# Patient Record
Sex: MALE | Race: WHITE | NOT HISPANIC OR LATINO | Employment: OTHER | URBAN - METROPOLITAN AREA
[De-identification: names, ages, dates, MRNs, and addresses within clinical notes are randomized per-mention and may not be internally consistent; named-entity substitution may affect disease eponyms.]

---

## 2017-01-23 ENCOUNTER — COMMUNICATION - HEALTHEAST (OUTPATIENT)
Dept: CARDIOLOGY | Facility: CLINIC | Age: 77
End: 2017-01-23

## 2017-03-20 ENCOUNTER — AMBULATORY - HEALTHEAST (OUTPATIENT)
Dept: CARDIOLOGY | Facility: CLINIC | Age: 77
End: 2017-03-20

## 2017-03-20 DIAGNOSIS — Z95.810 ICD (IMPLANTABLE CARDIOVERTER-DEFIBRILLATOR), SINGLE, IN SITU: ICD-10-CM

## 2017-03-20 ASSESSMENT — MIFFLIN-ST. JEOR: SCORE: 1498.25

## 2017-03-21 ENCOUNTER — COMMUNICATION - HEALTHEAST (OUTPATIENT)
Dept: CARDIOLOGY | Facility: CLINIC | Age: 77
End: 2017-03-21

## 2017-05-10 ENCOUNTER — COMMUNICATION - HEALTHEAST (OUTPATIENT)
Dept: CARDIOLOGY | Facility: CLINIC | Age: 77
End: 2017-05-10

## 2017-05-10 DIAGNOSIS — I42.9 CARDIOMYOPATHY (H): ICD-10-CM

## 2017-05-24 ENCOUNTER — COMMUNICATION - HEALTHEAST (OUTPATIENT)
Dept: CARDIOLOGY | Facility: CLINIC | Age: 77
End: 2017-05-24

## 2017-06-19 ENCOUNTER — AMBULATORY - HEALTHEAST (OUTPATIENT)
Dept: CARDIOLOGY | Facility: CLINIC | Age: 77
End: 2017-06-19

## 2017-06-19 DIAGNOSIS — Z95.810 ICD (IMPLANTABLE CARDIOVERTER-DEFIBRILLATOR), SINGLE, IN SITU: ICD-10-CM

## 2017-06-19 LAB — HCC DEVICE COMMENTS: NORMAL

## 2017-09-25 ENCOUNTER — AMBULATORY - HEALTHEAST (OUTPATIENT)
Dept: CARDIOLOGY | Facility: CLINIC | Age: 77
End: 2017-09-25

## 2017-09-25 DIAGNOSIS — Z95.810 ICD (IMPLANTABLE CARDIOVERTER-DEFIBRILLATOR), SINGLE, IN SITU: ICD-10-CM

## 2017-09-25 LAB — HCC DEVICE COMMENTS: NORMAL

## 2017-09-30 ENCOUNTER — COMMUNICATION - HEALTHEAST (OUTPATIENT)
Dept: CARDIOLOGY | Facility: CLINIC | Age: 77
End: 2017-09-30

## 2017-09-30 DIAGNOSIS — I42.9 CARDIOMYOPATHY (H): ICD-10-CM

## 2017-10-05 ENCOUNTER — RECORDS - HEALTHEAST (OUTPATIENT)
Dept: ADMINISTRATIVE | Facility: OTHER | Age: 77
End: 2017-10-05

## 2017-10-05 ENCOUNTER — AMBULATORY - HEALTHEAST (OUTPATIENT)
Dept: CARDIOLOGY | Facility: CLINIC | Age: 77
End: 2017-10-05

## 2017-10-11 ENCOUNTER — OFFICE VISIT - HEALTHEAST (OUTPATIENT)
Dept: CARDIOLOGY | Facility: CLINIC | Age: 77
End: 2017-10-11

## 2017-10-11 DIAGNOSIS — I42.0 DILATED CARDIOMYOPATHY (H): ICD-10-CM

## 2017-10-11 ASSESSMENT — MIFFLIN-ST. JEOR: SCORE: 1410.25

## 2017-10-12 ENCOUNTER — COMMUNICATION - HEALTHEAST (OUTPATIENT)
Dept: CARDIOLOGY | Facility: CLINIC | Age: 77
End: 2017-10-12

## 2017-10-12 DIAGNOSIS — I42.9 CARDIOMYOPATHY (H): ICD-10-CM

## 2017-10-23 ENCOUNTER — HOSPITAL ENCOUNTER (OUTPATIENT)
Dept: CARDIOLOGY | Facility: CLINIC | Age: 77
Discharge: HOME | End: 2017-10-23
Attending: INTERNAL MEDICINE

## 2017-10-23 DIAGNOSIS — I42.0 DILATED CARDIOMYOPATHY (H): ICD-10-CM

## 2017-10-23 LAB
AORTIC ROOT: 4.5 CM
AORTIC VALVE MEAN VELOCITY: 105 CM/S
AR DECEL SLOPE: 2570 MM/S2
AR PEAK VELOCITY: 459 CM/S
AV DIMENSIONLESS INDEX VTI: 0.5
AV MEAN GRADIENT: 5 MMHG
AV PEAK GRADIENT: 9.5 MMHG
AV REGURGITANT PEAK GRADIENT: 84.3 MMHG
AV REGURGITATION PRESSURE HALF TIME: 523 MS
AV VALVE AREA: 2.1 CM2
AV VELOCITY RATIO: 0.5
BSA FOR ECHO PROCEDURE: 1.83 M2
CV BLOOD PRESSURE: NORMAL MMHG
CV ECHO HEIGHT: 70 IN
CV ECHO WEIGHT: 150 LBS
DOP CALC AO PEAK VEL: 154 CM/S
DOP CALC AO VTI: 33.6 CM
DOP CALC LVOT AREA: 4.15 CM2
DOP CALC LVOT DIAMETER: 2.3 CM
DOP CALC LVOT PEAK VEL: 83.1 CM/S
DOP CALC LVOT STROKE VOLUME: 70.2 CM3
DOP CALCLVOT PEAK VEL VTI: 16.9 CM
EJECTION FRACTION: 37 % (ref 55–75)
FRACTIONAL SHORTENING: 26.8 % (ref 28–44)
INTERVENTRICULAR SEPTUM IN END DIASTOLE: 1.4 CM (ref 0.6–1)
IVS/PW RATIO: 0.9
LA AREA 1: 33.4 CM2
LA AREA 2: 40.1 CM2
LEFT ATRIUM LENGTH: 6.77 CM
LEFT ATRIUM SIZE: 5.4 CM
LEFT ATRIUM TO AORTIC ROOT RATIO: 1.23 NO UNITS
LEFT ATRIUM VOLUME INDEX: 91.9 ML/M2
LEFT ATRIUM VOLUME: 168.2 CM3
LEFT VENTRICLE CARDIAC INDEX: 2.8 L/MIN/M2
LEFT VENTRICLE CARDIAC OUTPUT: 5.1 L/MIN
LEFT VENTRICLE DIASTOLIC VOLUME INDEX: 47.5 CM3/M2 (ref 34–74)
LEFT VENTRICLE DIASTOLIC VOLUME: 87 CM3 (ref 62–150)
LEFT VENTRICLE HEART RATE: 72 BPM
LEFT VENTRICLE MASS INDEX: 199.7 G/M2
LEFT VENTRICLE SYSTOLIC VOLUME INDEX: 30.1 CM3/M2 (ref 11–31)
LEFT VENTRICLE SYSTOLIC VOLUME: 55 CM3 (ref 21–61)
LEFT VENTRICULAR INTERNAL DIMENSION IN DIASTOLE: 5.6 CM (ref 4.2–5.8)
LEFT VENTRICULAR INTERNAL DIMENSION IN SYSTOLE: 4.1 CM (ref 2.5–4)
LEFT VENTRICULAR MASS: 365.4 G
LEFT VENTRICULAR OUTFLOW TRACT MEAN GRADIENT: 1 MMHG
LEFT VENTRICULAR OUTFLOW TRACT MEAN VELOCITY: 50.2 CM/S
LEFT VENTRICULAR OUTFLOW TRACT PEAK GRADIENT: 3 MMHG
LEFT VENTRICULAR POSTERIOR WALL IN END DIASTOLE: 1.5 CM (ref 0.6–1)
LV STROKE VOLUME INDEX: 38.3 ML/M2
MITRAL REGURGITANT VELOCITY TIME INTEGRAL: 207 CM
MR MEAN GRADIENT: 88 MMHG
MR MEAN GRADIENT: 88 MMHG
MR MEAN VELOCITY: 439 CM/S
MR MEAN VELOCITY: 439 CM/S
MR PEAK GRADIENT: 135.5 MMHG
MR PISA EROA: 0.1 CM2
MR PISA RADIUS: 0.7 CM
MR PISA VN NYQUIST: 23.1 CM/S
MV E'TISSUE VEL-LAT: 11.8 CM/S
MV E'TISSUE VEL-MED: 5.26 CM/S
MV REGURGITANT VOLUME: 25.3 CC
NUC REST DIASTOLIC VOLUME INDEX: 2400 LBS
NUC REST SYSTOLIC VOLUME INDEX: 70 IN
PISA MR PEAK VEL: 582 CM/S
PR MAX PG: 6 MMHG
PR PEAK VELOCITY: 138 CM/S
TRICUSPID REGURGITATION PEAK PRESSURE GRADIENT: 40.7 MMHG
TRICUSPID VALVE ANULAR PLANE SYSTOLIC EXCURSION: 3.2 CM
TRICUSPID VALVE PEAK REGURGITANT VELOCITY: 319 CM/S

## 2017-10-23 ASSESSMENT — MIFFLIN-ST. JEOR: SCORE: 1396.65

## 2017-10-24 ENCOUNTER — AMBULATORY - HEALTHEAST (OUTPATIENT)
Dept: CARDIOLOGY | Facility: CLINIC | Age: 77
End: 2017-10-24

## 2017-10-24 DIAGNOSIS — I42.9 CARDIOMYOPATHY (H): ICD-10-CM

## 2017-11-01 ENCOUNTER — COMMUNICATION - HEALTHEAST (OUTPATIENT)
Dept: CARDIOLOGY | Facility: CLINIC | Age: 77
End: 2017-11-01

## 2018-01-03 ENCOUNTER — AMBULATORY - HEALTHEAST (OUTPATIENT)
Dept: CARDIOLOGY | Facility: CLINIC | Age: 78
End: 2018-01-03

## 2018-01-03 DIAGNOSIS — Z95.810 ICD (IMPLANTABLE CARDIOVERTER-DEFIBRILLATOR), SINGLE, IN SITU: ICD-10-CM

## 2018-01-04 LAB — HCC DEVICE COMMENTS: NORMAL

## 2018-03-19 ENCOUNTER — AMBULATORY - HEALTHEAST (OUTPATIENT)
Dept: CARDIOLOGY | Facility: CLINIC | Age: 78
End: 2018-03-19

## 2018-03-19 DIAGNOSIS — Z95.810 ICD (IMPLANTABLE CARDIOVERTER-DEFIBRILLATOR), SINGLE, IN SITU: ICD-10-CM

## 2018-03-19 RX ORDER — BRIMONIDINE TARTRATE 2 MG/ML
1 SOLUTION/ DROPS OPHTHALMIC 2 TIMES DAILY
Status: SHIPPED | COMMUNITY
Start: 2018-03-19

## 2018-03-19 ASSESSMENT — MIFFLIN-ST. JEOR: SCORE: 1455.61

## 2018-03-20 ENCOUNTER — COMMUNICATION - HEALTHEAST (OUTPATIENT)
Dept: CARDIOLOGY | Facility: CLINIC | Age: 78
End: 2018-03-20

## 2018-03-20 LAB — HCC DEVICE COMMENTS: NORMAL

## 2018-05-18 ENCOUNTER — RECORDS - HEALTHEAST (OUTPATIENT)
Dept: LAB | Facility: CLINIC | Age: 78
End: 2018-05-18

## 2018-05-18 LAB
HGB BLD-MCNC: 14.1 G/DL (ref 14–18)
POTASSIUM BLD-SCNC: 4.6 MMOL/L (ref 3.5–5)

## 2018-06-13 ENCOUNTER — AMBULATORY - HEALTHEAST (OUTPATIENT)
Dept: CARDIOLOGY | Facility: CLINIC | Age: 78
End: 2018-06-13

## 2018-06-13 DIAGNOSIS — Z95.810 ICD (IMPLANTABLE CARDIOVERTER-DEFIBRILLATOR), SINGLE, IN SITU: ICD-10-CM

## 2018-06-13 LAB
HCC DEVICE COMMENTS: NORMAL
HCC DEVICE IMPLANTING PROVIDER: NORMAL
HCC DEVICE MANUFACTURE: NORMAL
HCC DEVICE MODEL: NORMAL
HCC DEVICE SERIAL NUMBER: NORMAL
HCC DEVICE TYPE: NORMAL

## 2018-07-23 ENCOUNTER — MEDICAL CORRESPONDENCE (OUTPATIENT)
Dept: HEALTH INFORMATION MANAGEMENT | Facility: CLINIC | Age: 78
End: 2018-07-23

## 2018-07-23 ENCOUNTER — TRANSFERRED RECORDS (OUTPATIENT)
Dept: HEALTH INFORMATION MANAGEMENT | Facility: CLINIC | Age: 78
End: 2018-07-23

## 2018-07-31 ENCOUNTER — COMMUNICATION - HEALTHEAST (OUTPATIENT)
Dept: ADMINISTRATIVE | Facility: CLINIC | Age: 78
End: 2018-07-31

## 2018-09-19 ENCOUNTER — AMBULATORY - HEALTHEAST (OUTPATIENT)
Dept: CARDIOLOGY | Facility: CLINIC | Age: 78
End: 2018-09-19

## 2018-09-19 DIAGNOSIS — Z95.810 ICD (IMPLANTABLE CARDIOVERTER-DEFIBRILLATOR), SINGLE, IN SITU: ICD-10-CM

## 2018-10-02 ENCOUNTER — RECORDS - HEALTHEAST (OUTPATIENT)
Dept: ADMINISTRATIVE | Facility: OTHER | Age: 78
End: 2018-10-02

## 2018-10-09 ENCOUNTER — AMBULATORY - HEALTHEAST (OUTPATIENT)
Dept: CARDIOLOGY | Facility: CLINIC | Age: 78
End: 2018-10-09

## 2018-10-10 ENCOUNTER — OFFICE VISIT - HEALTHEAST (OUTPATIENT)
Dept: CARDIOLOGY | Facility: CLINIC | Age: 78
End: 2018-10-10

## 2018-10-10 DIAGNOSIS — I50.22 CHRONIC SYSTOLIC HEART FAILURE (H): ICD-10-CM

## 2018-10-10 LAB
ANION GAP SERPL CALCULATED.3IONS-SCNC: 6 MMOL/L (ref 5–18)
BNP SERPL-MCNC: 193 PG/ML (ref 0–80)
BUN SERPL-MCNC: 22 MG/DL (ref 8–28)
CALCIUM SERPL-MCNC: 10.2 MG/DL (ref 8.5–10.5)
CHLORIDE BLD-SCNC: 103 MMOL/L (ref 98–107)
CO2 SERPL-SCNC: 28 MMOL/L (ref 22–31)
CREAT SERPL-MCNC: 0.82 MG/DL (ref 0.7–1.3)
GFR SERPL CREATININE-BSD FRML MDRD: >60 ML/MIN/1.73M2
GLUCOSE BLD-MCNC: 88 MG/DL (ref 70–125)
POTASSIUM BLD-SCNC: 5 MMOL/L (ref 3.5–5)
SODIUM SERPL-SCNC: 137 MMOL/L (ref 136–145)

## 2018-10-10 ASSESSMENT — MIFFLIN-ST. JEOR: SCORE: 1446.54

## 2018-10-29 ENCOUNTER — RECORDS - HEALTHEAST (OUTPATIENT)
Dept: LAB | Facility: CLINIC | Age: 78
End: 2018-10-29

## 2018-10-29 LAB
HGB BLD-MCNC: 15.1 G/DL (ref 14–18)
POTASSIUM BLD-SCNC: 5 MMOL/L (ref 3.5–5)

## 2018-12-05 ENCOUNTER — COMMUNICATION - HEALTHEAST (OUTPATIENT)
Dept: CARDIOLOGY | Facility: CLINIC | Age: 78
End: 2018-12-05

## 2018-12-05 DIAGNOSIS — I42.9 CARDIOMYOPATHY (H): ICD-10-CM

## 2018-12-12 ENCOUNTER — COMMUNICATION - HEALTHEAST (OUTPATIENT)
Dept: CARDIOLOGY | Facility: CLINIC | Age: 78
End: 2018-12-12

## 2018-12-12 DIAGNOSIS — I42.9 CARDIOMYOPATHY (H): ICD-10-CM

## 2018-12-26 ENCOUNTER — AMBULATORY - HEALTHEAST (OUTPATIENT)
Dept: CARDIOLOGY | Facility: CLINIC | Age: 78
End: 2018-12-26

## 2018-12-26 DIAGNOSIS — Z95.810 ICD (IMPLANTABLE CARDIOVERTER-DEFIBRILLATOR), SINGLE, IN SITU: ICD-10-CM

## 2019-03-22 ENCOUNTER — AMBULATORY - HEALTHEAST (OUTPATIENT)
Dept: CARDIOLOGY | Facility: CLINIC | Age: 79
End: 2019-03-22

## 2019-03-22 DIAGNOSIS — Z95.810 ICD (IMPLANTABLE CARDIOVERTER-DEFIBRILLATOR), SINGLE, IN SITU: ICD-10-CM

## 2019-03-22 RX ORDER — DORZOLAMIDE HCL 20 MG/ML
1 SOLUTION/ DROPS OPHTHALMIC DAILY
Status: SHIPPED | COMMUNITY
Start: 2019-02-21

## 2019-03-22 ASSESSMENT — MIFFLIN-ST. JEOR: SCORE: 1460.15

## 2019-06-18 ENCOUNTER — AMBULATORY - HEALTHEAST (OUTPATIENT)
Dept: CARDIOLOGY | Facility: CLINIC | Age: 79
End: 2019-06-18

## 2019-06-18 DIAGNOSIS — Z95.810 ICD (IMPLANTABLE CARDIOVERTER-DEFIBRILLATOR), SINGLE, IN SITU: ICD-10-CM

## 2019-07-24 ENCOUNTER — COMMUNICATION - HEALTHEAST (OUTPATIENT)
Dept: ADMINISTRATIVE | Facility: CLINIC | Age: 79
End: 2019-07-24

## 2019-09-19 ENCOUNTER — AMBULATORY - HEALTHEAST (OUTPATIENT)
Dept: CARDIOLOGY | Facility: CLINIC | Age: 79
End: 2019-09-19

## 2019-09-19 DIAGNOSIS — Z95.810 ICD (IMPLANTABLE CARDIOVERTER-DEFIBRILLATOR), SINGLE, IN SITU: ICD-10-CM

## 2019-09-27 ENCOUNTER — RECORDS - HEALTHEAST (OUTPATIENT)
Dept: ADMINISTRATIVE | Facility: OTHER | Age: 79
End: 2019-09-27

## 2019-09-27 ENCOUNTER — AMBULATORY - HEALTHEAST (OUTPATIENT)
Dept: CARDIOLOGY | Facility: CLINIC | Age: 79
End: 2019-09-27

## 2019-10-07 ENCOUNTER — COMMUNICATION - HEALTHEAST (OUTPATIENT)
Dept: CARDIOLOGY | Facility: CLINIC | Age: 79
End: 2019-10-07

## 2019-10-07 DIAGNOSIS — I42.9 CARDIOMYOPATHY (H): ICD-10-CM

## 2019-11-08 ENCOUNTER — AMBULATORY - HEALTHEAST (OUTPATIENT)
Dept: CARDIOLOGY | Facility: CLINIC | Age: 79
End: 2019-11-08

## 2019-11-08 ENCOUNTER — OFFICE VISIT - HEALTHEAST (OUTPATIENT)
Dept: CARDIOLOGY | Facility: CLINIC | Age: 79
End: 2019-11-08

## 2019-11-08 DIAGNOSIS — I42.0 DILATED CARDIOMYOPATHY (H): ICD-10-CM

## 2019-11-08 DIAGNOSIS — R93.1 LEFT VENTRICULAR EJECTION FRACTION LESS THAN 40%: ICD-10-CM

## 2019-11-08 DIAGNOSIS — Z95.810 ICD (IMPLANTABLE CARDIOVERTER-DEFIBRILLATOR), SINGLE, IN SITU: ICD-10-CM

## 2019-11-08 DIAGNOSIS — I50.22 CHRONIC SYSTOLIC HEART FAILURE (H): ICD-10-CM

## 2019-11-08 ASSESSMENT — MIFFLIN-ST. JEOR: SCORE: 1438.83

## 2020-01-11 ENCOUNTER — COMMUNICATION - HEALTHEAST (OUTPATIENT)
Dept: CARDIOLOGY | Facility: CLINIC | Age: 80
End: 2020-01-11

## 2020-01-11 DIAGNOSIS — I42.9 CARDIOMYOPATHY (H): ICD-10-CM

## 2020-01-17 ENCOUNTER — COMMUNICATION - HEALTHEAST (OUTPATIENT)
Dept: CARDIOLOGY | Facility: CLINIC | Age: 80
End: 2020-01-17

## 2020-01-17 DIAGNOSIS — I42.9 CARDIOMYOPATHY (H): ICD-10-CM

## 2020-02-10 ENCOUNTER — AMBULATORY - HEALTHEAST (OUTPATIENT)
Dept: CARDIOLOGY | Facility: CLINIC | Age: 80
End: 2020-02-10

## 2020-02-10 DIAGNOSIS — R93.1 LEFT VENTRICULAR EJECTION FRACTION LESS THAN 40%: ICD-10-CM

## 2020-02-10 DIAGNOSIS — Z95.810 ICD (IMPLANTABLE CARDIOVERTER-DEFIBRILLATOR), SINGLE, IN SITU: ICD-10-CM

## 2020-05-11 ENCOUNTER — AMBULATORY - HEALTHEAST (OUTPATIENT)
Dept: CARDIOLOGY | Facility: CLINIC | Age: 80
End: 2020-05-11

## 2020-05-11 DIAGNOSIS — I42.0 DILATED CARDIOMYOPATHY (H): ICD-10-CM

## 2020-05-11 DIAGNOSIS — Z95.810 ICD (IMPLANTABLE CARDIOVERTER-DEFIBRILLATOR), SINGLE, IN SITU: ICD-10-CM

## 2020-08-12 ENCOUNTER — AMBULATORY - HEALTHEAST (OUTPATIENT)
Dept: CARDIOLOGY | Facility: CLINIC | Age: 80
End: 2020-08-12

## 2020-08-12 DIAGNOSIS — Z95.810 ICD (IMPLANTABLE CARDIOVERTER-DEFIBRILLATOR), SINGLE, IN SITU: ICD-10-CM

## 2020-08-12 DIAGNOSIS — I42.0 DILATED CARDIOMYOPATHY (H): ICD-10-CM

## 2020-10-13 ENCOUNTER — COMMUNICATION - HEALTHEAST (OUTPATIENT)
Dept: CARDIOLOGY | Facility: CLINIC | Age: 80
End: 2020-10-13

## 2020-10-13 DIAGNOSIS — I42.9 CARDIOMYOPATHY (H): ICD-10-CM

## 2020-11-11 ENCOUNTER — AMBULATORY - HEALTHEAST (OUTPATIENT)
Dept: CARDIOLOGY | Facility: CLINIC | Age: 80
End: 2020-11-11

## 2020-11-11 DIAGNOSIS — I42.0 DILATED CARDIOMYOPATHY (H): ICD-10-CM

## 2020-11-11 DIAGNOSIS — Z95.810 ICD (IMPLANTABLE CARDIOVERTER-DEFIBRILLATOR), SINGLE, IN SITU: ICD-10-CM

## 2020-11-13 ENCOUNTER — OFFICE VISIT - HEALTHEAST (OUTPATIENT)
Dept: CARDIOLOGY | Facility: CLINIC | Age: 80
End: 2020-11-13

## 2020-11-13 ENCOUNTER — COMMUNICATION - HEALTHEAST (OUTPATIENT)
Dept: CARDIOLOGY | Facility: CLINIC | Age: 80
End: 2020-11-13

## 2020-11-13 DIAGNOSIS — I42.0 DILATED CARDIOMYOPATHY (H): ICD-10-CM

## 2020-11-13 DIAGNOSIS — I50.22 CHRONIC SYSTOLIC HEART FAILURE (H): ICD-10-CM

## 2020-11-13 LAB
ANION GAP SERPL CALCULATED.3IONS-SCNC: 8 MMOL/L (ref 5–18)
BNP SERPL-MCNC: 450 PG/ML (ref 0–84)
BUN SERPL-MCNC: 19 MG/DL (ref 8–28)
CALCIUM SERPL-MCNC: 9.4 MG/DL (ref 8.5–10.5)
CHLORIDE BLD-SCNC: 106 MMOL/L (ref 98–107)
CO2 SERPL-SCNC: 24 MMOL/L (ref 22–31)
CREAT SERPL-MCNC: 0.86 MG/DL (ref 0.7–1.3)
ERYTHROCYTE [DISTWIDTH] IN BLOOD BY AUTOMATED COUNT: 12 % (ref 11–14.5)
GFR SERPL CREATININE-BSD FRML MDRD: >60 ML/MIN/1.73M2
GLUCOSE BLD-MCNC: 102 MG/DL (ref 70–125)
HCT VFR BLD AUTO: 42.7 % (ref 40–54)
HGB BLD-MCNC: 14.2 G/DL (ref 14–18)
MCH RBC QN AUTO: 33.2 PG (ref 27–34)
MCHC RBC AUTO-ENTMCNC: 33.3 G/DL (ref 32–36)
MCV RBC AUTO: 100 FL (ref 80–100)
PLATELET # BLD AUTO: 210 THOU/UL (ref 140–440)
PMV BLD AUTO: 10 FL (ref 8.5–12.5)
POTASSIUM BLD-SCNC: 5 MMOL/L (ref 3.5–5)
RBC # BLD AUTO: 4.28 MILL/UL (ref 4.4–6.2)
SODIUM SERPL-SCNC: 138 MMOL/L (ref 136–145)
WBC: 7.6 THOU/UL (ref 4–11)

## 2020-11-13 RX ORDER — LATANOPROSTENE BUNOD 0.24 MG/ML
1 SOLUTION/ DROPS OPHTHALMIC DAILY
Status: SHIPPED | COMMUNITY
Start: 2020-10-13

## 2020-11-14 ENCOUNTER — COMMUNICATION - HEALTHEAST (OUTPATIENT)
Dept: CARDIOLOGY | Facility: CLINIC | Age: 80
End: 2020-11-14

## 2020-11-14 DIAGNOSIS — I42.9 CARDIOMYOPATHY (H): ICD-10-CM

## 2020-11-16 RX ORDER — FUROSEMIDE 20 MG
TABLET ORAL
Qty: 90 TABLET | Refills: 2 | Status: SHIPPED | OUTPATIENT
Start: 2020-11-16 | End: 2021-01-01

## 2021-01-01 ENCOUNTER — LAB REQUISITION (OUTPATIENT)
Dept: LAB | Facility: CLINIC | Age: 81
End: 2021-01-01
Payer: MEDICARE

## 2021-01-01 DIAGNOSIS — R05.9 COUGH, UNSPECIFIED: ICD-10-CM

## 2021-01-01 DIAGNOSIS — I42.9 CARDIOMYOPATHY (H): ICD-10-CM

## 2021-01-01 LAB
SARS-COV-2 RNA RESP QL NAA+PROBE: DETECTED
SARS-COV-2 RNA RESP QL NAA+PROBE: NORMAL

## 2021-01-01 PROCEDURE — U0005 INFEC AGEN DETEC AMPLI PROBE: HCPCS | Mod: ORL | Performed by: NURSE PRACTITIONER

## 2021-01-01 RX ORDER — FUROSEMIDE 20 MG
20 TABLET ORAL DAILY
Qty: 90 TABLET | Refills: 0 | Status: SHIPPED | OUTPATIENT
Start: 2021-01-01 | End: 2021-01-01

## 2021-01-01 RX ORDER — LOSARTAN POTASSIUM 50 MG/1
50 TABLET ORAL DAILY
Qty: 90 TABLET | Refills: 0 | Status: SHIPPED | OUTPATIENT
Start: 2021-01-01 | End: 2022-01-01

## 2021-01-12 ENCOUNTER — COMMUNICATION - HEALTHEAST (OUTPATIENT)
Dept: CARDIOLOGY | Facility: CLINIC | Age: 81
End: 2021-01-12

## 2021-01-12 DIAGNOSIS — I42.9 CARDIOMYOPATHY (H): ICD-10-CM

## 2021-01-12 RX ORDER — LOSARTAN POTASSIUM 50 MG/1
50 TABLET ORAL DAILY
Qty: 90 TABLET | Refills: 2 | Status: SHIPPED | OUTPATIENT
Start: 2021-01-12 | End: 2021-01-01

## 2021-02-10 ENCOUNTER — AMBULATORY - HEALTHEAST (OUTPATIENT)
Dept: CARDIOLOGY | Facility: CLINIC | Age: 81
End: 2021-02-10

## 2021-02-10 DIAGNOSIS — Z95.810 ICD (IMPLANTABLE CARDIOVERTER-DEFIBRILLATOR), SINGLE, IN SITU: ICD-10-CM

## 2021-02-10 DIAGNOSIS — I42.0 DILATED CARDIOMYOPATHY (H): ICD-10-CM

## 2021-05-12 ENCOUNTER — AMBULATORY - HEALTHEAST (OUTPATIENT)
Dept: CARDIOLOGY | Facility: CLINIC | Age: 81
End: 2021-05-12

## 2021-05-12 DIAGNOSIS — Z95.810 ICD (IMPLANTABLE CARDIOVERTER-DEFIBRILLATOR), SINGLE, IN SITU: ICD-10-CM

## 2021-05-12 DIAGNOSIS — I42.0 DILATED CARDIOMYOPATHY (H): ICD-10-CM

## 2021-05-26 NOTE — PROGRESS NOTES
In clinic device check.  Please see link for full device report.  Patient was informed of results and next follow up during today's visit.

## 2021-05-30 VITALS — HEIGHT: 70 IN | WEIGHT: 172.4 LBS | BODY MASS INDEX: 24.68 KG/M2

## 2021-05-31 VITALS — HEIGHT: 70 IN | WEIGHT: 153 LBS | BODY MASS INDEX: 21.9 KG/M2

## 2021-05-31 VITALS — HEIGHT: 70 IN | BODY MASS INDEX: 21.47 KG/M2 | WEIGHT: 150 LBS

## 2021-06-01 VITALS — BODY MASS INDEX: 23.34 KG/M2 | WEIGHT: 163 LBS | HEIGHT: 70 IN

## 2021-06-02 VITALS — WEIGHT: 161 LBS | HEIGHT: 70 IN | BODY MASS INDEX: 23.05 KG/M2

## 2021-06-02 VITALS — BODY MASS INDEX: 23.48 KG/M2 | HEIGHT: 70 IN | WEIGHT: 164 LBS

## 2021-06-03 VITALS
SYSTOLIC BLOOD PRESSURE: 128 MMHG | HEIGHT: 70 IN | HEART RATE: 60 BPM | WEIGHT: 159.3 LBS | DIASTOLIC BLOOD PRESSURE: 78 MMHG | RESPIRATION RATE: 24 BRPM | BODY MASS INDEX: 22.81 KG/M2

## 2021-06-03 NOTE — PATIENT INSTRUCTIONS - HE
Richard Millan,    It was a pleasure to see you today at the [facility].     My recommendations after this visit include:    Same medications    WSarah Pineda MD, FACC, JAIRO

## 2021-06-03 NOTE — PROGRESS NOTES
In clinic device check with Device RN and follow-up with Dr. Pineda.  Please see link for full device report.  Patient was informed of results and next follow up during today's visit.

## 2021-06-03 NOTE — PROGRESS NOTES
"Cardiology Progress Note    Assessment:  Dilated cardiomyopathy, moderately depressed LV systolic function, presumably nonischemic,   compensated, no fluid overload on physical exam today  Chronic atrial fibrillation, rate controlled, not anticoagulated per patient's decision  AICD, normal function      Plan:  Continue current medications    Follow-up in 1 year    Subjective:   This is 79 y.o. male who comes in today for follow-up visit.  He reports no new cardiac symptoms.  His weight has been stable.  He has no PND and orthopnea.  He denies chest pains or heart palpitations.  He is compliant with current medications    Review of Systems:   General: WNL  Eyes: WNL  Ears/Nose/Throat: WNL  Lungs: WNL  Heart: WNL  Stomach: WNL  Bladder: WNL  Muscle/Joints: WNL  Skin: WNL  Nervous System: WNL  Mental Health: WNL     Blood: WNL    Objective:   /78 (Patient Site: Left Arm, Patient Position: Sitting, Cuff Size: Adult Regular)   Pulse 60   Resp 24   Ht 5' 10\" (1.778 m)   Wt 159 lb 4.8 oz (72.3 kg)   BMI 22.86 kg/m    Physical Exam:  GENERAL: no distress  NECK: No JVD  LUNGS: Clear to auscultation.  CARDIAC: irregular rhythm, S1 & S2 normal.  No heaves, thrills, gallops or murmurs.  ABDOMEN: flat, negative hepatosplenomegaly, soft and non-tender.  EXTREMITIES: No evidence of cyanosis, clubbing or edema.    Current Outpatient Medications   Medication Sig Dispense Refill     aspirin 81 MG EC tablet Take 81 mg by mouth daily.       brimonidine (ALPHAGAN) 0.2 % ophthalmic solution Administer 1 drop to both eyes 2 (two) times a day.       dorzolamide (TRUSOPT) 2 % ophthalmic solution Administer 1 drop to both eyes daily.       FOLIC ACID/MULTIVIT-MIN/LUTEIN (CENTRUM SILVER ORAL) Take 1 tablet by mouth daily.       furosemide (LASIX) 20 MG tablet TAKE ONE TABLET BY MOUTH ONE TIME DAILY  90 tablet 2     latanoprost (XALATAN) 0.005 % ophthalmic solution Administer 1 drop to both eyes daily.  4     losartan (COZAAR) 50 MG " tablet TAKE ONE TABLET BY MOUTH ONE TIME DAILY 90 tablet 0     metoprolol succinate (TOPROL-XL) 25 MG Take 0.5 tablets (12.5 mg total) by mouth daily. (Patient taking differently: Take 25 mg by mouth daily. ) 45 tablet 3     No current facility-administered medications for this visit.        Cardiographics:    SPECT interrogation: Chronic A. fib 10% V paced short runs of nonsustained VT, normal device function    Cardiac MR: September 2015   1. Left ventricle is mildly enlarged with severe left ventricular systolic   dysfunction. There is no abnormal gadolinium enhancement. LVEF: 23%   2. Right ventricle is mildly enlarged with severe right   ventricular systolic dysfunction.   3. Moderate mitral regurgitation.   4. Normal ascending and descending aorta size.      Echo: October 2017  1. The left ventricle is normal in size. Left ventricular systolic performance is moderately reduced. The ejection fraction is estimated to be 35-40%.   2. There is moderate global reduction in left ventricular systolic performance.   3. There is mild concentric increase in left ventricular wall thickness.  4. There is mild aortic insufficiency.   5. There is moderate mitral insufficiency.   6. There is moderate tricuspid insufficiency.   7. Normal right ventricular size and systolic performance.   8. There is moderate to severe left atrial enlargement. There is moderate right atrial enlargement.   9. Right ventricular systolic pressure relative to right atrial pressure is mildly increased.  The pulmonary artery pressure is estimated to be 40-45 mmHg plus right atrial pressure (the IVC is of fairly normal caliber).      When compared to the prior real-time echocardiogram dated 3 February 2016, there has been a mild decline in left ventricular systolic performance from previous.  There is been an increase in the degree of mitral and tricuspid insufficiency which both appear to be moderate on the current examination.    Lab Results:          BNP   Date Value Ref Range Status   10/10/2018 193 (H) 0 - 80 pg/mL Final       Malcolm (Jv)  MD Miriam

## 2021-06-05 VITALS
SYSTOLIC BLOOD PRESSURE: 132 MMHG | WEIGHT: 159 LBS | OXYGEN SATURATION: 97 % | BODY MASS INDEX: 22.81 KG/M2 | DIASTOLIC BLOOD PRESSURE: 64 MMHG | RESPIRATION RATE: 16 BRPM | HEART RATE: 54 BPM

## 2021-06-13 NOTE — PROGRESS NOTES
"Cardiology Progress Note    Assessment:  Dilated cardiomyopathy, presumably nonischemic, recent episode of fluid overload, improved  Chronic atrial fibrillation, rate controlled, anticoagulated   AICD      Plan:  BMP and BNP today  Reassess LV systolic function with echo    He has been very reluctant to take CHF medications in the past.  We will need to convince him to increase medication doses if left ventricular function is down again.    Routine follow-up in 1 year    Subjective:   This is 77 y.o. male who comes in today for follow-up visit.  In May of this year he developed lower extremity edema.  He did not have increased shortness of breath at that time.  He was seen by his primary physician.  He was given higher dose of furosemide.  He has lost 20 pounds.  He denies PND and orthopnea.  He occasionally feels spells of shortness of breath.  His weight has not increased lately.  He denies exertional chest pains or heart palpitations.  He has not had the AICD firing    Review of Systems:   General: WNL  Eyes: WNL  Ears/Nose/Throat: WNL  Lungs: WNL  Heart: WNL  Stomach: WNL  Bladder: WNL  Muscle/Joints: WNL  Skin: WNL  Nervous System: WNL  Mental Health: WNL     Blood: WNL    Objective:   /84 (Patient Site: Left Arm, Patient Position: Sitting, Cuff Size: Adult Regular)  Pulse 68  Resp 16  Ht 5' 10\" (1.778 m)  Wt 153 lb (69.4 kg)  BMI 21.95 kg/m2  Physical Exam:  GENERAL: no distress  NECK: No JVD  LUNGS: Clear to auscultation.  CARDIAC: regular rhythm, S1 & S2 normal.  No heaves, thrills, gallops or murmurs.  ABDOMEN: flat, negative hepatosplenomegaly, soft and non-tender.  EXTREMITIES: No evidence of cyanosis, clubbing or edema.    Current Outpatient Prescriptions   Medication Sig Dispense Refill     FOLIC ACID/MULTIVIT-MIN/LUTEIN (CENTRUM SILVER ORAL) Take 1 tablet by mouth daily.       furosemide (LASIX) 20 MG tablet Take 20 mg by mouth every other day. Frequency change by Dr Pineda       losartan " (COZAAR) 25 MG tablet TAKE ONE TABLET BY MOUTH ONE TIME DAILY  90 tablet 0     MAGNESIUM OXIDE ORAL Take 1 tablet by mouth daily.       metoprolol succinate (TOPROL-XL) 25 MG Take 0.5 tablets (12.5 mg total) by mouth daily. 45 tablet 3     apixaban (ELIQUIS) 2.5 mg Tab tablet Take 1 tablet (2.5 mg total) by mouth 2 (two) times a day. 60 tablet 11     latanoprost (XALATAN) 0.005 % ophthalmic solution Administer 1 drop to both eyes daily.  4     No current facility-administered medications for this visit.        Cardiographics:    Cardiac MR September 2015 1. Left ventricle is mildly enlarged with severe left ventricular systolic   dysfunction. There is no abnormal gadolinium enhancement. LVEF: 23%   2. Right ventricle is mildly enlarged with severe right   ventricular systolic dysfunction.   3. Moderate mitral regurgitation.   4. Normal ascending and descending aorta size.     Echo: January 2016 Left ventricular size is borderline increased.  Global hypokinesis of the left ventricle with mild to moderate impairment  of systolic function.  Left ventricular ejection fraction is visually estimated to be 45%.  Abnormal (paradoxical) septal motion consistent with pacing.  Mildly thickened aortic valve leaflets with preserved leaflet mobility.  Mild aortic regurgitation.  Mild to moderate mitral regurgitation is present.  Mild tricuspid regurgitation.      BNP   Date Value Ref Range Status   01/14/2016 141 (H) 0 - 74 pg/mL Final       Malcolm Pineda MD (Ted)

## 2021-06-13 NOTE — PATIENT INSTRUCTIONS - HE
Richard Millan,    It was a pleasure to see you today at the [facility].     My recommendations after this visit include:    Blood work  Start taking furosemide every day if you notice increasing shortness of breath or weight gain    JUSTIN Pineda MD, FACC, [facility]

## 2021-06-15 PROBLEM — Z95.810 ICD (IMPLANTABLE CARDIOVERTER-DEFIBRILLATOR), SINGLE, IN SITU: Status: ACTIVE | Noted: 2017-03-20

## 2021-06-16 NOTE — PROGRESS NOTES
"Recheck of blood pressure: 148/80 mm Hg.  In clinic device check.  Please see link for full device report.  Patient was informed of results and next follow up during today's visit.     Mr. Millan tells me he \"stopped taking that Eliquis about a year ago.  It's way too expensive.  I told Dr. Pineda I wasn't taking it.  I take aspirin instead - 81 milligrams.\"  Mr. Millan has also stopped taking his magnesium.  Routed to Dr. Almendarez for review as Dr. Pineda is out of the office this week.  Dr. Pineda's last clinic note dated 10/11/2017 still indicates Mr. Millan as taking apixaban.  Mr. Millan's rhythm is permanent atrial fibrillation.  Sudha Orozco, RN, MA  "

## 2021-06-19 NOTE — LETTER
Letter by Malcolm Pineda MD (Ted) at      Author: Malcolm Pineda MD (Ted) Service: -- Author Type: --    Filed:  Encounter Date: 7/24/2019 Status: (Other)         Richard Millan  [address]  [place] [facility] [address]      July 24, 2019      Dear Richard,    This letter is to remind you that you will be due for your follow up appointment with Dr. Jv Pineda  . To help ensure you are in the best health possible, a regular follow-up with your cardiologist is essential.     Please call our Patient Scheduling Line at [phone] to schedule your appointment at your earliest convenience.  If you have recently scheduled an appointment, please disregard this letter.    We look forward to seeing you again. As always, we are available at the number  above for any questions or concerns you may have.      Sincerely,     The Physicians and Staff of [facility]

## 2021-06-19 NOTE — LETTER
Letter by Karen Andrews at      Author: Karen Andrews Service: -- Author Type: --    Filed:  Encounter Date: 9/19/2019 Status: (Other)         Richard Millan  [address] [place] [address]      September 19, 2019      Dear Mr. Millan,    RE: Remote Results    We are writing to you regarding your recent Remote ICD check from home. Your transmission was received successfully. Battery status is satisfactory at this time.     Your results are showing no significant changes.    Your next device appointment will be a remote check on December 23, 2019; this will occur automatically.    To schedule or reschedule, please call [phone] and press 1.    NOTE: If you would like to do an extra transmission, please call [phone] and press 3 to speak to a nurse BEFORE transmitting. This ensures that the Device Clinic staff is aware of the reason you are sending a transmission, and can follow-up with you after it has been reviewed.    We will be checking your implanted device from home (remotely) every three months unless otherwise instructed. We will need to see you in the clinic at least once a year. You may need to be seen in the clinic sooner depending on the results of your check.    Please be aware:    The follow-up schedule is like a Physician prescription.    Your remote monitor is paired to your specific implanted device.      Sincerely,    [facility]

## 2021-06-19 NOTE — LETTER
Letter by Karen Andrews at      Author: Karen Andrews Service: -- Author Type: --    Filed:  Encounter Date: 6/18/2019 Status: (Other)         Richard Millan  [address] [place] [address]      June 18, 2019      Dear Mr. Millan,    RE: Remote Results    We are writing to you regarding your recent Remote ICD check from home. Your transmission was received successfully. Battery status is satisfactory at this time.     Your results are within normal limits.    Your next device appointment will be a remote check on September 19, 2019; this will occur automatically.    To schedule or reschedule, please call [phone] and press 1.    NOTE: If you would like to do an extra transmission, please call [phone] and press 3 to speak to a nurse BEFORE transmitting. This ensures that the Device Clinic staff is aware of the reason you are sending a transmission, and can follow-up with you after it has been reviewed.    We will be checking your implanted device from home (remotely) every three months unless otherwise instructed. We will need to see you in the clinic at least once a year. You may need to be seen in the clinic sooner depending on the results of your check.    Please be aware:    The follow-up schedule is like a Physician prescription.    Your remote monitor is paired to your specific implanted device.      Sincerely,    [facility]

## 2021-06-20 NOTE — LETTER
Letter by Kayley Steve RDCS at      Author: Kayley Steve RDCS Service: -- Author Type: --    Filed:  Encounter Date: 8/12/2020 Status: (Other)         Richard Millan  [address]  [place] [address]      August 12, 2020      Dear Mr. Millan,    RE: Remote Results    We are writing to you regarding your recent Remote ICD check from home. Your transmission was received successfully. Battery status is satisfactory at this time.     Your results are showing no significant changes.    Your next device appointment will be a remote check on November 11, 2020; this will occur automatically.   However, you are due to see Dr. Pineda in November, please call in late September to schedule: [phone] and press 1.    NOTE: If you would like to do an extra transmission, please call [phone] and press 3 to speak to a nurse BEFORE transmitting. This ensures that the Device Clinic staff is aware of the reason you are sending a transmission, and can follow-up with you after it has been reviewed.    We will be checking your implanted device from home (remotely) every three months unless otherwise instructed.  If there are any concerns noted on your home checks one of our nurses will be in contact with you to discuss. The need for in-clinic device checks will be reviewed on a yearly basis by our nursing team and will be based on stability of your home checks.     Please be aware:    The follow-up schedule is like a Physician prescription.    Your remote monitor is paired to your specific implanted device.      Sincerely,    [facility]

## 2021-06-20 NOTE — PROGRESS NOTES
"Cardiology Progress Note    Assessment:  Dilated cardiomyopathy, moderately depressed LV systolic function, presumably nonischemic,  questionable volume status  Chronic atrial fibrillation, rate controlled, not anticoagulated per patient's decision  AICD, normal function      Plan:  Basic metabolic panel and BNP today .  Adjust diuretics accordingly    Historically he has been very reluctant to take medication for congestive heart failure and atrial fibrillation.  He understands the risk of noncompliance including risk of stroke and risk of worsening CHF.    Follow-up in 1 year    Subjective:   This is 78 y.o. male who comes in today for follow-up visit.  He reports no new cardiac symptoms.  He denies chest pain or exertional shortness of breath.  He does report that he has gained 10 pounds.  He has not had peripheral edema or PND.  Last year he was found to have worsening of of LV systolic function.  I started him back on losartan.  I asked him to come back and see nurse practitioner for up titration of the medications.  He was noncompliant with my recommendations.  He also stopped taking anticoagulant.    Review of Systems:   General: WNL  Eyes: WNL  Ears/Nose/Throat: WNL  Lungs: WNL  Heart: WNL  Stomach: WNL  Bladder: WNL  Muscle/Joints: WNL  Skin: WNL  Nervous System: WNL  Mental Health: WNL     Blood: WNL    Objective:   /70 (Patient Site: Left Arm, Patient Position: Sitting, Cuff Size: Adult Regular)  Pulse 68  Resp 16  Ht 5' 10\" (1.778 m)  Wt 161 lb (73 kg)  BMI 23.1 kg/m2  Physical Exam:  GENERAL: no distress  NECK: No JVD  LUNGS: Clear to auscultation.  CARDIAC: irregular rhythm, S1 & S2 normal.  No heaves, thrills, gallops or murmurs.  ABDOMEN: flat, negative hepatosplenomegaly, soft and non-tender.  EXTREMITIES: No evidence of cyanosis, clubbing or edema.    Current Outpatient Prescriptions   Medication Sig Dispense Refill     aspirin 81 MG EC tablet Take 81 mg by mouth daily.       brimonidine " (ALPHAGAN) 0.2 % ophthalmic solution Administer 1 drop to both eyes 2 (two) times a day.       FOLIC ACID/MULTIVIT-MIN/LUTEIN (CENTRUM SILVER ORAL) Take 1 tablet by mouth daily.       furosemide (LASIX) 20 MG tablet Take 1 tablet (20 mg total) by mouth daily. Frequency change by Dr Pineda 90 tablet 3     latanoprost (XALATAN) 0.005 % ophthalmic solution Administer 1 drop to both eyes daily.  4     losartan (COZAAR) 50 MG tablet Take 1 tablet (50 mg total) by mouth daily. 90 tablet 3     metoprolol succinate (TOPROL-XL) 25 MG Take 0.5 tablets (12.5 mg total) by mouth daily. (Patient taking differently: Take 25 mg by mouth daily. ) 45 tablet 3     No current facility-administered medications for this visit.        Cardiographics:    Cardiac MR: September 2015   1. Left ventricle is mildly enlarged with severe left ventricular systolic   dysfunction. There is no abnormal gadolinium enhancement. LVEF: 23%   2. Right ventricle is mildly enlarged with severe right   ventricular systolic dysfunction.   3. Moderate mitral regurgitation.   4. Normal ascending and descending aorta size.      Echo: October 2017  1. The left ventricle is normal in size. Left ventricular systolic performance is moderately reduced. The ejection fraction is estimated to be 35-40%.   2. There is moderate global reduction in left ventricular systolic performance.   3. There is mild concentric increase in left ventricular wall thickness.  4. There is mild aortic insufficiency.   5. There is moderate mitral insufficiency.   6. There is moderate tricuspid insufficiency.   7. Normal right ventricular size and systolic performance.   8. There is moderate to severe left atrial enlargement. There is moderate right atrial enlargement.   9. Right ventricular systolic pressure relative to right atrial pressure is mildly increased.  The pulmonary artery pressure is estimated to be 40-45 mmHg plus right atrial pressure (the IVC is of fairly normal caliber).       When compared to the prior real-time echocardiogram dated 3 February 2016, there has been a mild decline in left ventricular systolic performance from previous.  There is been an increase in the degree of mitral and tricuspid insufficiency which both appear to be moderate on the current examination.    Lab Results:       No results found for: CHOL  No results found for: HDL  No results found for: LDLCALC  No results found for: TRIG  BNP   Date Value Ref Range Status   10/11/2017 294 (H) 0 - 78 pg/mL Final       Malcolm (Jv)  MD Miriam

## 2021-06-20 NOTE — LETTER
Letter by Karen Andrews at      Author: Karen Andrews Service: -- Author Type: --    Filed:  Encounter Date: 2/10/2020 Status: (Other)         Richard Millan  [address] [place] [address]      February 10, 2020      Dear Mr. Millan,    RE: Remote Results    We are writing to you regarding your recent Remote ICD check from home. Your transmission was received successfully. Battery status is satisfactory at this time.     Your results are showing no significant changes.    Your next device appointment will be a remote check on May 11, 2020; this will occur automatically.    To schedule or reschedule, please call [phone] and press 1.    NOTE: If you would like to do an extra transmission, please call [phone] and press 3 to speak to a nurse BEFORE transmitting. This ensures that the Device Clinic staff is aware of the reason you are sending a transmission, and can follow-up with you after it has been reviewed.    We will be checking your implanted device from home (remotely) every three months unless otherwise instructed. We will need to see you in the clinic at least once a year. You may need to be seen in the clinic sooner depending on the results of your check.    Please be aware:    The follow-up schedule is like a Physician prescription.    Your remote monitor is paired to your specific implanted device.      Sincerely,    [facility]

## 2021-06-20 NOTE — LETTER
Letter by Kayley Steve RDCS at      Author: Kayley Steve RDCS Service: -- Author Type: --    Filed:  Encounter Date: 5/11/2020 Status: (Other)         Richard Millan  [address]  [place] [address]      May 11, 2020      Dear Mr. Millan,    RE: Remote Results    We are writing to you regarding your recent Remote ICD check from home. Your transmission was received successfully. Battery status is satisfactory at this time.     Your results are showing no significant changes.    Your next device appointment will be a remote check on August 12, 2020; this will occur automatically.    To schedule or reschedule, please call [phone] and press 1.    NOTE: If you would like to do an extra transmission, please call [phone] and press 3 to speak to a nurse BEFORE transmitting. This ensures that the Device Clinic staff is aware of the reason you are sending a transmission, and can follow-up with you after it has been reviewed.    We will be checking your implanted device from home (remotely) every three months unless otherwise instructed. We will need to see you in the clinic at least once a year. You may need to be seen in the clinic sooner depending on the results of your check.    Please be aware:    The follow-up schedule is like a Physician prescription.    Your remote monitor is paired to your specific implanted device.      Sincerely,    [facility]

## 2021-06-21 NOTE — LETTER
Letter by Shey Bashir RN at      Author: Shey Bashir RN Service: -- Author Type: --    Filed:  Encounter Date: 11/13/2020 Status: (Other)         Richard Millan  [address] [place] [address]             November 13, 2020         Dear Mr. Millan,    Below are the results from your recent visit:    Resulted Orders   Basic Metabolic Panel   Result Value Ref Range    Sodium 138 136 - 145 mmol/L    Potassium 5.0 3.5 - 5.0 mmol/L    Chloride 106 98 - 107 mmol/L    CO2 24 22 - 31 mmol/L    Anion Gap, Calculation 8 5 - 18 mmol/L    Glucose 102 70 - 125 mg/dL    Calcium 9.4 8.5 - 10.5 mg/dL    BUN 19 8 - 28 mg/dL    Creatinine 0.86 0.70 - 1.30 mg/dL    GFR MDRD Af Amer >60 >60 mL/min/1.73m2    GFR MDRD Non Af Amer >60 >60 mL/min/1.73m2    Narrative    Fasting Glucose reference range is 70-99 mg/dL per  American Diabetes Association (ADA) guidelines.   HM2(CBC w/o Differential)   Result Value Ref Range    WBC 7.6 4.0 - 11.0 thou/uL    RBC 4.28 (L) 4.40 - 6.20 mill/uL    Hemoglobin 14.2 14.0 - 18.0 g/dL    Hematocrit 42.7 40.0 - 54.0 %     80 - 100 fL    MCH 33.2 27.0 - 34.0 pg    MCHC 33.3 32.0 - 36.0 g/dL    RDW 12.0 11.0 - 14.5 %    Platelets 210 140 - 440 thou/uL    MPV 10.0 8.5 - 12.5 fL   BNP(B-type Natriuretic Peptide)   Result Value Ref Range     (H) 0 - 84 pg/mL     The test results show that your BNP was elevated indicating that you could be retaining some extra fluid. As discussed, you should try to take your Furosemide daily. The rest of your labs were stable. Here is Dr. Pineda's message:    Malcolm Pineda (Jv), Shey Landin, RN             BNP is up indicating increased fluid retention.  He should go back to taking furosemide every day or he will develop worsening shortness of breath and edema         Please call with questions or contact us using MyChart.    Sincerely,    Shey SPENCE

## 2021-06-21 NOTE — LETTER
Letter by Karen Andrews at      Author: Karen Andrews Service: -- Author Type: --    Filed:  Encounter Date: 2/10/2021 Status: (Other)         Richard Millan  [address] [place] [address]      February 10, 2021      Dear Mr. Millan,    RE: Remote Results    We are writing to you regarding your recent Remote ICD check from home. Your transmission was received successfully. Battery status is satisfactory at this time.     Your results are within normal limits.    Your next device appointment will be a remote check on May 12, 2021; this will occur automatically.    To schedule or reschedule, please call [phone] and press 1.    NOTE: If you would like to do an extra transmission, please call [phone] and press 3 to speak to a nurse BEFORE transmitting. This ensures that the [facility] Clinic staff is aware of the reason you are sending a transmission, and can follow-up with you after it has been reviewed.    We will be checking your implanted device from home (remotely) every three months unless otherwise instructed. We will need to see you in the clinic at least once a year. You may need to be seen in the clinic sooner depending on the results of your check.    Please be aware:    The follow-up schedule is like a Physician prescription.    Your remote monitor is paired to your specific implanted device.      Sincerely,    [facility]

## 2021-06-21 NOTE — LETTER
Letter by Karen Andrews at      Author: Karen Andrews Service: -- Author Type: --    Filed:  Encounter Date: 11/11/2020 Status: (Other)         Richard Millan  [address] [place] [address]      November 11, 2020      Dear Mr. Millan,    RE: Remote Results    We are writing to you regarding your recent Remote ICD check from home. Your transmission was received successfully. Battery status is satisfactory at this time.     Your results are showing no significant changes.    Your next device appointment will be a remote check on February 10, 2021; this will occur automatically.    To schedule or reschedule, please call [phone] and press 1.    NOTE: If you would like to do an extra transmission, please call [phone] and press 3 to speak to a nurse BEFORE transmitting. This ensures that the Device Clinic staff is aware of the reason you are sending a transmission, and can follow-up with you after it has been reviewed.    We will be checking your implanted device from home (remotely) every three months unless otherwise instructed. We will need to see you in the clinic at least once a year. You may need to be seen in the clinic sooner depending on the results of your check.    Please be aware:    The follow-up schedule is like a Physician prescription.    Your remote monitor is paired to your specific implanted device.      Sincerely,    [facility]

## 2021-06-21 NOTE — LETTER
Letter by Karen Andrews at      Author: Karen Andrews Service: -- Author Type: --    Filed:  Encounter Date: 5/12/2021 Status: (Other)         Richard Millan  [address] [place] [address]      May 12, 2021      Dear Mr. Millan,    RE: Remote Results    We are writing to you regarding your recent Remote ICD check from home. Your transmission was received successfully. Battery status is satisfactory at this time.     Your results are within normal limits.    Your next device appointment will be a remote check on August 23, 2021; this will occur automatically.    To schedule or reschedule, please call [phone] and press 1.    NOTE: If you would like to do an extra transmission, please call [phone] and press 3 to speak to a nurse BEFORE transmitting. This ensures that the Device Clinic staff is aware of the reason you are sending a transmission, and can follow-up with you after it has been reviewed.    We will be checking your implanted device from home (remotely) every three months unless otherwise instructed. We will need to see you in the clinic at least once a year. You may need to be seen in the clinic sooner depending on the results of your check.    Please be aware:    The follow-up schedule is like a Physician prescription.    Your remote monitor is paired to your specific implanted device.      Sincerely,    [facility]

## 2021-06-22 NOTE — PROGRESS NOTES
Pt refuses anticoagulation per Dr. Pineda and Dr. Nagy.   Latasha Hannon RN BSN PHN  Device Nurse   [facility]

## 2021-06-29 NOTE — PROGRESS NOTES
Progress Notes by Malcolm Pineda MD (Ted) at 11/13/2020  8:50 AM     Author: Maloclm Pineda MD (Ted) Service: -- Author Type: Physician    Filed: 11/13/2020  9:25 AM Encounter Date: 11/13/2020 Status: Signed    : Malcolm Pineda MD (Ted) (Physician)           Cardiology Progress Note    Assessment:  Dilated cardiomyopathy, moderately depressed LV systolic function, presumably nonischemic,  compensated  Chronic atrial fibrillation, rate controlled/mildly bradycardic, not anticoagulated per patient's decision  AICD, normal function      Plan:  Basic metabolic panel, BNP and CBC today    Continue current medications    Follow-up in 1 year  Subjective:   This is 80 y.o. male who comes in today for follow-up visit.  He reports no chest pain or increasing shortness of breath.  His weight has been stable.  He has no PND and orthopnea.  He complains of being dizzy at times.  He denies syncope or near syncope.  He has not had heart palpitations or AICD firing.    Review of Systems:   General: WNL  Eyes: Visual Distubance  Ears/Nose/Throat: WNL  Lungs: Shortness of Breath  Heart: Shortness of Breath with activity  Stomach: WNL  Bladder: WNL  Muscle/Joints: WNL  Skin: WNL  Nervous System: Dizziness  Mental Health: Anxiety     Blood: WNL    Objective:   /64 (Patient Site: Right Arm, Patient Position: Sitting, Cuff Size: Adult Regular)   Pulse (!) 54   Resp 16   Wt 159 lb (72.1 kg)   SpO2 97%   BMI 22.81 kg/m    Physical Exam:  GENERAL: no distress  NECK: No JVD  LUNGS: Few crackles at the bases  CARDIAC: irregular rhythm, S1 & S2 normal.  No heaves, thrills, gallops or murmurs.  ABDOMEN: flat, negative hepatosplenomegaly, soft and non-tender.  EXTREMITIES: No evidence of cyanosis, clubbing or edema.    Current Outpatient Medications   Medication Sig Dispense Refill   ? brimonidine (ALPHAGAN) 0.2 % ophthalmic solution Administer 1 drop to both eyes 2 (two) times a day.     ?  dorzolamide (TRUSOPT) 2 % ophthalmic solution Administer 1 drop to both eyes daily.     ? FOLIC ACID/MULTIVIT-MIN/LUTEIN (CENTRUM SILVER ORAL) Take 1 tablet by mouth daily.     ? furosemide (LASIX) 20 MG tablet TAKE ONE TABLET BY MOUTH ONE TIME DAILY 90 tablet 2   ? latanoprost (XALATAN) 0.005 % ophthalmic solution Administer 1 drop to both eyes daily.  4   ? losartan (COZAAR) 50 MG tablet Take 1 tablet (50 mg total) by mouth daily. 90 tablet 0   ? metoprolol succinate (TOPROL-XL) 25 MG Take 0.5 tablets (12.5 mg total) by mouth daily. (Patient taking differently: Take 25 mg by mouth daily. ) 45 tablet 3   ? VYZULTA 0.024 % Drop INSTILL 1 GTT INTO OU QD     ? aspirin 81 MG EC tablet Take 81 mg by mouth daily.       No current facility-administered medications for this visit.        Cardiographics:    AICD interrogation: No therapies, 14% RV paced(backup pacing set up for40 bpm)    Cardiac MR: September 2015   1. Left ventricle is mildly enlarged with severe left ventricular systolic   dysfunction. There is no abnormal gadolinium enhancement. LVEF: 23%   2. Right ventricle is mildly enlarged with severe right   ventricular systolic dysfunction.   3. Moderate mitral regurgitation.   4. Normal ascending and descending aorta size.      Echo: October 2017  1. The left ventricle is normal in size. Left ventricular systolic performance is moderately reduced. The ejection fraction is estimated to be 35-40%.   2. There is moderate global reduction in left ventricular systolic performance.   3. There is mild concentric increase in left ventricular wall thickness.  4. There is mild aortic insufficiency.   5. There is moderate mitral insufficiency.   6. There is moderate tricuspid insufficiency.   7. Normal right ventricular size and systolic performance.   8. There is moderate to severe left atrial enlargement. There is moderate right atrial enlargement.   9. Right ventricular systolic pressure relative to right atrial pressure  is mildly increased.  The pulmonary artery pressure is estimated to be 40-45 mmHg plus right atrial pressure (the IVC is of fairly normal caliber).      When compared to the prior real-time echocardiogram dated 3 February 2016, there has been a mild decline in left ventricular systolic performance from previous.  There is been an increase in the degree of mitral and tricuspid insufficiency which both appear to be moderate on the current examination.    BNP   Date Value Ref Range Status   10/10/2018 193 (H) 0 - 80 pg/mL Final       Malcolm (Jv)  MD Miriam

## 2021-07-02 ENCOUNTER — AMBULATORY - HEALTHEAST (OUTPATIENT)
Dept: LAB | Facility: HOSPITAL | Age: 81
End: 2021-07-02

## 2021-07-02 ENCOUNTER — RECORDS - HEALTHEAST (OUTPATIENT)
Dept: ADMINISTRATIVE | Facility: OTHER | Age: 81
End: 2021-07-02

## 2021-07-02 DIAGNOSIS — G25.0 BENIGN ESSENTIAL TREMOR: ICD-10-CM

## 2021-07-02 DIAGNOSIS — M54.2 CERVICALGIA: ICD-10-CM

## 2021-07-02 DIAGNOSIS — R29.898 DEFICIENCIES OF LIMBS: ICD-10-CM

## 2021-07-02 DIAGNOSIS — R20.0 TACTILE ANESTHESIA: ICD-10-CM

## 2021-07-02 LAB
ALBUMIN SERPL-MCNC: 4.1 G/DL (ref 3.5–5)
ALP SERPL-CCNC: 82 U/L (ref 45–120)
ALT SERPL W P-5'-P-CCNC: 10 U/L (ref 0–45)
ANION GAP SERPL CALCULATED.3IONS-SCNC: 10 MMOL/L (ref 5–18)
AST SERPL W P-5'-P-CCNC: 16 U/L (ref 0–40)
BILIRUB SERPL-MCNC: 1.7 MG/DL (ref 0–1)
BUN SERPL-MCNC: 30 MG/DL (ref 8–28)
C REACTIVE PROTEIN LHE: <0.1 MG/DL (ref 0–0.8)
CALCIUM SERPL-MCNC: 9.8 MG/DL (ref 8.5–10.5)
CHLORIDE BLD-SCNC: 102 MMOL/L (ref 98–107)
CK SERPL-CCNC: 26 U/L (ref 30–190)
CO2 SERPL-SCNC: 26 MMOL/L (ref 22–31)
CREAT SERPL-MCNC: 1.04 MG/DL (ref 0.7–1.3)
ERYTHROCYTE [SEDIMENTATION RATE] IN BLOOD BY WESTERGREN METHOD: 2 MM/HR (ref 0–15)
FOLATE SERPL-MCNC: 10 NG/ML
GFR SERPL CREATININE-BSD FRML MDRD: >60 ML/MIN/1.73M2
GLUCOSE BLD-MCNC: 126 MG/DL (ref 70–125)
POTASSIUM BLD-SCNC: 4 MMOL/L (ref 3.5–5)
PROT SERPL-MCNC: 7.5 G/DL (ref 6–8)
RHEUMATOID FACT SERPL-ACNC: <15 IU/ML (ref 0–30)
SODIUM SERPL-SCNC: 138 MMOL/L (ref 136–145)
T4 FREE SERPL-MCNC: 1.1 NG/DL (ref 0.7–1.8)
TSH SERPL DL<=0.005 MIU/L-ACNC: 0.92 UIU/ML (ref 0.3–5)
VIT B12 SERPL-MCNC: 309 PG/ML (ref 213–816)

## 2021-07-06 LAB
AMPHIPHYSIN AB TITR SER: NEGATIVE TITER
ANA SER QL: 0.3 U
CASPR2-IGG CBA,S: NEGATIVE
CV2 IGG TITR SER: NEGATIVE TITER
GLIAL NUC TYPE 1 AB TITR SER: NEGATIVE TITER
HU1 AB TITR SER: NEGATIVE TITER
HU2 AB TITR SER IF: NEGATIVE TITER
HU3 AB TITR SER: NEGATIVE TITER
IMMUNOLOGIST REVIEW: NORMAL
LABORATORY COMMENT REPORT: NORMAL
LGI1-IGG CBA,S: NEGATIVE
NACHR AB SER-SCNC: 0 NMOL/L
PCA-1 AB TITR SER: NEGATIVE TITER
PCA-2 AB TITR SER: NEGATIVE TITER
PCA-TR AB TITR SER IF: NEGATIVE TITER
VGCC-P/Q BIND AB SER-SCNC: 0 NMOL/L
VGKC AB SER-SCNC: 0.01 NMOL/L

## 2021-07-07 LAB
ALBUMIN PERCENT: 63.7 % (ref 51–67)
ALBUMIN SERPL ELPH-MCNC: 4.7 G/DL (ref 3.2–4.7)
ALPHA 1 PERCENT: 1.7 % (ref 2–4)
ALPHA 2 PERCENT: 8.2 % (ref 5–13)
ALPHA1 GLOB SERPL ELPH-MCNC: 0.1 G/DL (ref 0.1–0.3)
ALPHA2 GLOB SERPL ELPH-MCNC: 0.6 G/DL (ref 0.4–0.9)
B-GLOBULIN SERPL ELPH-MCNC: 0.7 G/DL (ref 0.7–1.2)
BETA PERCENT: 9.8 % (ref 10–17)
GAMMA GLOB SERPL ELPH-MCNC: 1.2 G/DL (ref 0.6–1.4)
GAMMA GLOBULIN PERCENT: 16.6 % (ref 9–20)
PATH ICD:: ABNORMAL
PATH ICD:: NORMAL
PROT PATTERN SERPL ELPH-IMP: ABNORMAL
PROT PATTERN SERPL IFE-IMP: NORMAL
PROT SERPL-MCNC: 7.3 G/DL (ref 6–8)
REVIEWING PATHOLOGIST: ABNORMAL
REVIEWING PATHOLOGIST: NORMAL

## 2021-07-19 ENCOUNTER — THERAPY VISIT (OUTPATIENT)
Dept: PHYSICAL THERAPY | Facility: CLINIC | Age: 81
End: 2021-07-19
Payer: MEDICARE

## 2021-07-19 DIAGNOSIS — M54.2 NECK PAIN: ICD-10-CM

## 2021-07-19 PROCEDURE — 97161 PT EVAL LOW COMPLEX 20 MIN: CPT | Mod: GP | Performed by: PHYSICAL THERAPIST

## 2021-07-19 PROCEDURE — 97530 THERAPEUTIC ACTIVITIES: CPT | Mod: GP | Performed by: PHYSICAL THERAPIST

## 2021-07-19 PROCEDURE — 97110 THERAPEUTIC EXERCISES: CPT | Mod: GP | Performed by: PHYSICAL THERAPIST

## 2021-07-19 NOTE — PROGRESS NOTES
[facility] Initial Evaluation    Subjective:  Richard Millan is a 81 year old male with a cervical condition.   Mechanism of injury: chronic neck pain for the past 3 years starting for unknown reasons.   Where: (home, work, MVA, community, recreation/sport, unknown, other): NA.   Onset of symptoms:  Date of PT order: July 2021.   Location of symptoms: bilateral neck and upper traps.   Pain level on number scale: 5/10.   Quality of pain: ache, sharp.   Associated symptoms: stiffness.   Pain frequency (constant/intermittent): intermittent.   Symptoms are exacerbated by: sitting, reading, sleeping, rotating head.   Symptoms are relieved by: sitting with good postuer.   Progression of symptoms since onset (same/better/worse): same.   Special tests (x-ray, MRI, CT scan, EMG, bone scan): none.   Previous treatment: None.   Improvement with previous treatment: NA.   General health as reported by patient is fair.   Pertinent medical history includes: Heart problems - pacemaker See Epic.  Medical allergies: see Epic.   Other pertinent surgeries: see Epic.   Current medications: See Epic.   Occupation: retired.   Patient is (working in normal job without restrictions/working in normal job with restrictions/working in an alternate job/not working due to present treatment problem): NA.   Primary job tasks: NA.   Barriers at home/work: None reported by patient.   Red flags: None reported by patient.    Objective  Posture     Sitting (good/fair/poor):  poor  Standing (good/fair/poor):  poor  Protruded head (yes/no):  no  Wry neck (right/left/nil):  nil  Relevant wry neck (yes/no):  no  Correction of posture (better/worse/no effect): better    Neurological    Myotomes L R   C4 (shoulder elevation) 5/5 5/5   C5 (shoulder abduction) 3+/5 3+/5   C6 (elbow flexion) 4/5 4/5   C7 (elbow extension) 4-/5 4-/5   C8 (thumb extension) 4/5 4/5   T1 (finger add/abd) 3+/5 3+/5     Sensory Deficit, Reflexes,  Dural Signs: sensory deficits in T8 dermatome on left UE.    Cervical Movement Loss Giovanni Mod Min Nil Pain   Protrusion    x    Flexion    x    Retraction x       Extension  x      Rotation Left  x      Rotation Right  x      Lateral Flexion Right  x      Lateral Flexion Left  x        Assessment/Plan:    Patient is a 81 year old male with cervical complaints.    Patient has the following significant findings with corresponding treatment plan.                Diagnosis 1:  Neck pain  Pain -  manual therapy, self management, education, directional preference exercise and home program  Decreased ROM/flexibility - manual therapy, therapeutic exercise, therapeutic activity and home program  Decreased joint mobility - manual therapy, therapeutic exercise, therapeutic activity and home program  Decreased strength - therapeutic exercise, therapeutic activities and home program  Decreased proprioception - neuro re-education, therapeutic activities and home program  Impaired muscle performance - neuro re-education and home program  Decreased function - therapeutic activities and home program  Impaired posture - neuro re-education, therapeutic activities and home program    Therapy Evaluation Codes:   1) History comprised of:   Personal factors that impact the plan of care:      None.    Comorbidity factors that impact the plan of care are:      Implanted device.     Medications impacting care: None.  2) Examination of Body Systems comprised of:   Body structures and functions that impact the plan of care:      Cervical spine.   Activity limitations that impact the plan of care are:      Driving, Reading/Computer work and Sleeping.  3) Clinical presentation characteristics are:   Stable/Uncomplicated.  4) Decision-Making    Low complexity using standardized patient assessment instrument and/or measureable assessment of functional outcome.  Cumulative Therapy Evaluation is: Low complexity.    Previous and current functional  limitations:  (See Goal Flow Sheet for this information)    Short term and Long term goals: (See Goal Flow Sheet for this information)     Communication ability:  Patient appears to be able to clearly communicate and understand verbal and written communication and follow directions correctly.  Treatment Explanation - The following has been discussed with the patient:   RX ordered/plan of care  Anticipated outcomes  Possible risks and side effects  This patient would benefit from PT intervention to resume normal activities.   Rehab potential is good.    Frequency:  1 X week, once daily  Duration:  for 8 weeks  Discharge Plan:  Achieve all LTG.  Independent in home treatment program.  Reach maximal therapeutic benefit.    Please refer to the daily flowsheet for treatment today, total treatment time and time spent performing 1:1 timed codes.

## 2021-07-19 NOTE — LETTER
DEPARTMENT OF HEALTH AND HUMAN SERVICES  CENTERS FOR MEDICARE & MEDICAID SERVICES    PLAN/UPDATED PLAN OF PROGRESS FOR OUTPATIENT REHABILITATION    PATIENTS NAME:  Richard Millan   : 1940  PROVIDER NUMBER:    [id number]  HICN: [id number]   PROVIDER NAME: [facility]  MEDICAL RECORD NUMBER: [id number]   START OF CARE DATE:  SOC Date: 21   TYPE:  PT  PRIMARY/TREATMENT DIAGNOSIS: (Pertinent Medical Diagnosis)  Neck pain  VISITS FROM START OF CARE:  Rxs Used: 1     [facility] Initial Evaluation    Subjective:  Richard Millan is a 81 year old male with a cervical condition.   Mechanism of injury: chronic neck pain for the past 3 years starting for unknown reasons.   Where: (home, work, MVA, community, recreation/sport, unknown, other): NA.   Onset of symptoms:  Date of PT order: 2021.   Location of symptoms: bilateral neck and upper traps.   Pain level on number scale: 5/10.   Quality of pain: ache, sharp.   Associated symptoms: stiffness.   Pain frequency (constant/intermittent): intermittent.   Symptoms are exacerbated by: sitting, reading, sleeping, rotating head.   Symptoms are relieved by: sitting with good postuer.   Progression of symptoms since onset (same/better/worse): same.   Special tests (x-ray, MRI, CT scan, EMG, bone scan): none.   Previous treatment: None.   Improvement with previous treatment: NA.   General health as reported by patient is fair.   Pertinent medical history includes: Heart problems - pacemaker See Epic.  Medical allergies: see Epic.   Other pertinent surgeries: see Epic.   Current medications: See Epic.   Occupation: retired.   Patient is (working in normal job without restrictions/working in normal job with restrictions/working in an alternate job/not working due to present treatment problem): NA.   Primary job tasks: NA.   Barriers at home/work: None reported by patient.   Red flags: None  reported by patient.    Objective  Posture   Sitting (good/fair/poor):  poor  Standing (good/fair/poor):  poor  Protruded head (yes/no):  no  Wry neck (right/left/nil):  Nil  PATIENTS NAME:  Richard Millan   : 1940    Relevant wry neck (yes/no):  no  Correction of posture (better/worse/no effect): better    Neurological    Myotomes L R   C4 (shoulder elevation) 5/5 5/5   C5 (shoulder abduction) 3+/5 3+/5   C6 (elbow flexion) 4/5 4/5   C7 (elbow extension) 4-/5 4-/5   C8 (thumb extension) 4/5 4/5   T1 (finger add/abd) 3+/5 3+/5     Sensory Deficit, Reflexes, Dural Signs: sensory deficits in T8 dermatome on left UE.    Cervical Movement Loss Giovanni Mod Min Nil Pain   Protrusion    x    Flexion    x    Retraction x       Extension  x      Rotation Left  x      Rotation Right  x      Lateral Flexion Right  x      Lateral Flexion Left  x        Assessment/Plan:    Patient is a 81 year old male with cervical complaints.    Patient has the following significant findings with corresponding treatment plan.                Diagnosis 1:  Neck pain  Pain -  manual therapy, self management, education, directional preference exercise and home program  Decreased ROM/flexibility - manual therapy, therapeutic exercise, therapeutic activity and home program  Decreased joint mobility - manual therapy, therapeutic exercise, therapeutic activity and home program  Decreased strength - therapeutic exercise, therapeutic activities and home program  Decreased proprioception - neuro re-education, therapeutic activities and home program  Impaired muscle performance - neuro re-education and home program  Decreased function - therapeutic activities and home program  Impaired posture - neuro re-education, therapeutic activities and home program    Therapy Evaluation Codes:   1) History comprised of:   Personal factors that impact the plan of care:      None.    Comorbidity factors that impact the plan of care are:      Implanted device.   "   Medications impacting care: None.  2) Examination of Body Systems comprised of:   Body structures and functions that impact the plan of care:   PATIENTS NAME:  Richard Millan   : 1940       Cervical spine.   Activity limitations that impact the plan of care are:      Driving, Reading/Computer work and Sleeping.  3) Clinical presentation characteristics are:   Stable/Uncomplicated.  4) Decision-Making    Low complexity using standardized patient assessment instrument and/or measureable assessment of functional outcome.  Cumulative Therapy Evaluation is: Low complexity.    Previous and current functional limitations:  (See Goal Flow Sheet for this information)    Short term and Long term goals: (See Goal Flow Sheet for this information)     Communication ability:  Patient appears to be able to clearly communicate and understand verbal and written communication and follow directions correctly.  Treatment Explanation - The following has been discussed with the patient:   RX ordered/plan of care  Anticipated outcomes  Possible risks and side effects  This patient would benefit from PT intervention to resume normal activities.   Rehab potential is good.    Frequency:  1 X week, once daily  Duration:  for 8 weeks  Discharge Plan:  Achieve all LTG.  Independent in home treatment program.  Reach maximal therapeutic benefit.      Caregiver Signature/Credentials _____________________________ Date ________       Treating Provider: Morris HERNANDEZT   I have reviewed and certified the need for these services and plan of treatment while under my care.        PHYSICIAN'S SIGNATURE:   _________________________________________  Date___________   Ilia Murray MD    Certification period:  Beginning of Cert date period: 21 to  End of Cert period date: 10/16/21     Functional Level Progress Report: Please see attached \"Goal Flow sheet for Functional level.\"    ____X____ Continue Services or       ________ DC Services "                Service dates: From  SOC Date: 07/19/21 date to present

## 2021-07-26 ENCOUNTER — THERAPY VISIT (OUTPATIENT)
Dept: PHYSICAL THERAPY | Facility: CLINIC | Age: 81
End: 2021-07-26
Payer: MEDICARE

## 2021-07-26 DIAGNOSIS — M54.2 NECK PAIN: ICD-10-CM

## 2021-07-26 PROCEDURE — 97110 THERAPEUTIC EXERCISES: CPT | Mod: GP | Performed by: PHYSICAL THERAPIST

## 2021-07-26 PROCEDURE — 97530 THERAPEUTIC ACTIVITIES: CPT | Mod: GP | Performed by: PHYSICAL THERAPIST

## 2021-08-02 ENCOUNTER — THERAPY VISIT (OUTPATIENT)
Dept: PHYSICAL THERAPY | Facility: CLINIC | Age: 81
End: 2021-08-02
Payer: MEDICARE

## 2021-08-02 DIAGNOSIS — M54.2 NECK PAIN: ICD-10-CM

## 2021-08-02 PROCEDURE — 97110 THERAPEUTIC EXERCISES: CPT | Mod: GP | Performed by: PHYSICAL THERAPIST

## 2021-08-02 PROCEDURE — 97112 NEUROMUSCULAR REEDUCATION: CPT | Mod: GP | Performed by: PHYSICAL THERAPIST

## 2021-08-16 ENCOUNTER — THERAPY VISIT (OUTPATIENT)
Dept: PHYSICAL THERAPY | Facility: CLINIC | Age: 81
End: 2021-08-16
Payer: MEDICARE

## 2021-08-16 DIAGNOSIS — M54.2 NECK PAIN: ICD-10-CM

## 2021-08-16 PROCEDURE — 97110 THERAPEUTIC EXERCISES: CPT | Mod: GP | Performed by: PHYSICAL THERAPIST

## 2021-08-16 PROCEDURE — 97112 NEUROMUSCULAR REEDUCATION: CPT | Mod: GP | Performed by: PHYSICAL THERAPIST

## 2021-08-16 NOTE — PROGRESS NOTES
DISCHARGE REPORT    Progress reporting period is from 7/19/21 to 8/16/21.       SUBJECTIVE  Subjective changes noted by patient:  Patient reports he is working hard on improving his posture. Patient reports consistency with his HEP. Patient wishes to be discharged from PT at this time.    Current Pain level: 0/10.     Initial Pain level: 5/10.   Changes in function:  Yes (See Goal flowsheet attached for changes in current functional level)  Adverse reaction to treatment or activity: None    OBJECTIVE  Changes noted in objective findings:  Yes,   Objective: weak scapular stabilizers; cervical AROM rotation = min loss bilaterally     ASSESSMENT/PLAN  Updated problem list and treatment plan: Diagnosis 1:  Neck pain    STG/LTGs have been met or progress has been made towards goals:  Yes (See Goal flow sheet completed today.)  Assessment of Progress: The patient's condition is improving.  Self Management Plans:  Patient is independent in a home treatment program.  Patient is independent in self management of symptoms.    Richard continues to require the following intervention to meet STG and LTG's:  PT intervention is no longer required to meet STG/LTG.    Recommendations:  This patient is ready to be discharged from therapy and continue their home treatment program.    Please refer to the daily flowsheet for treatment today, total treatment time and time spent performing 1:1 timed codes.

## 2021-08-23 ENCOUNTER — ANCILLARY PROCEDURE (OUTPATIENT)
Dept: CARDIOLOGY | Facility: CLINIC | Age: 81
End: 2021-08-23
Attending: INTERNAL MEDICINE
Payer: MEDICARE

## 2021-08-23 DIAGNOSIS — Z95.810 ICD (IMPLANTABLE CARDIOVERTER-DEFIBRILLATOR) IN PLACE: ICD-10-CM

## 2021-08-23 LAB
MDC_IDC_EPISODE_DTM: NORMAL
MDC_IDC_EPISODE_ID: NORMAL
MDC_IDC_EPISODE_TYPE: NORMAL
MDC_IDC_LEAD_IMPLANT_DT: NORMAL
MDC_IDC_LEAD_LOCATION: NORMAL
MDC_IDC_LEAD_LOCATION_DETAIL_1: NORMAL
MDC_IDC_LEAD_MFG: NORMAL
MDC_IDC_LEAD_MODEL: NORMAL
MDC_IDC_LEAD_POLARITY_TYPE: NORMAL
MDC_IDC_LEAD_SERIAL: NORMAL
MDC_IDC_LEAD_SPECIAL_FUNCTION: NORMAL
MDC_IDC_MSMT_BATTERY_DTM: NORMAL
MDC_IDC_MSMT_BATTERY_REMAINING_LONGEVITY: 138 MO
MDC_IDC_MSMT_BATTERY_REMAINING_PERCENTAGE: 100 %
MDC_IDC_MSMT_BATTERY_STATUS: NORMAL
MDC_IDC_MSMT_CAP_CHARGE_DTM: NORMAL
MDC_IDC_MSMT_CAP_CHARGE_TIME: 10.8 S
MDC_IDC_MSMT_CAP_CHARGE_TYPE: NORMAL
MDC_IDC_MSMT_LEADCHNL_RV_IMPEDANCE_VALUE: 424 OHM
MDC_IDC_MSMT_LEADCHNL_RV_PACING_THRESHOLD_AMPLITUDE: 0.8 V
MDC_IDC_MSMT_LEADCHNL_RV_PACING_THRESHOLD_PULSEWIDTH: 0.5 MS
MDC_IDC_PG_IMPLANT_DTM: NORMAL
MDC_IDC_PG_MFG: NORMAL
MDC_IDC_PG_MODEL: NORMAL
MDC_IDC_PG_SERIAL: NORMAL
MDC_IDC_PG_TYPE: NORMAL
MDC_IDC_SESS_CLINIC_NAME: NORMAL
MDC_IDC_SESS_DTM: NORMAL
MDC_IDC_SESS_TYPE: NORMAL
MDC_IDC_SET_BRADY_LOWRATE: 40 {BEATS}/MIN
MDC_IDC_SET_BRADY_MODE: NORMAL
MDC_IDC_SET_LEADCHNL_RV_PACING_AMPLITUDE: 1.5 V
MDC_IDC_SET_LEADCHNL_RV_PACING_POLARITY: NORMAL
MDC_IDC_SET_LEADCHNL_RV_PACING_PULSEWIDTH: 0.5 MS
MDC_IDC_SET_LEADCHNL_RV_SENSING_ADAPTATION_MODE: NORMAL
MDC_IDC_SET_LEADCHNL_RV_SENSING_POLARITY: NORMAL
MDC_IDC_SET_LEADCHNL_RV_SENSING_SENSITIVITY: 0.6 MV
MDC_IDC_SET_ZONE_DETECTION_INTERVAL: 300 MS
MDC_IDC_SET_ZONE_DETECTION_INTERVAL: 353 MS
MDC_IDC_SET_ZONE_DETECTION_INTERVAL: 400 MS
MDC_IDC_SET_ZONE_TYPE: NORMAL
MDC_IDC_SET_ZONE_VENDOR_TYPE: NORMAL
MDC_IDC_STAT_BRADY_DTM_END: NORMAL
MDC_IDC_STAT_BRADY_DTM_START: NORMAL
MDC_IDC_STAT_BRADY_RV_PERCENT_PACED: 27 %
MDC_IDC_STAT_EPISODE_RECENT_COUNT: 0
MDC_IDC_STAT_EPISODE_RECENT_COUNT: 5
MDC_IDC_STAT_EPISODE_RECENT_COUNT_DTM_END: NORMAL
MDC_IDC_STAT_EPISODE_RECENT_COUNT_DTM_START: NORMAL
MDC_IDC_STAT_EPISODE_TYPE: NORMAL
MDC_IDC_STAT_EPISODE_VENDOR_TYPE: NORMAL
MDC_IDC_STAT_TACHYTHERAPY_ATP_DELIVERED_RECENT: 0
MDC_IDC_STAT_TACHYTHERAPY_ATP_DELIVERED_TOTAL: 0
MDC_IDC_STAT_TACHYTHERAPY_RECENT_DTM_END: NORMAL
MDC_IDC_STAT_TACHYTHERAPY_RECENT_DTM_START: NORMAL
MDC_IDC_STAT_TACHYTHERAPY_SHOCKS_ABORTED_RECENT: 0
MDC_IDC_STAT_TACHYTHERAPY_SHOCKS_ABORTED_TOTAL: 0
MDC_IDC_STAT_TACHYTHERAPY_SHOCKS_DELIVERED_RECENT: 0
MDC_IDC_STAT_TACHYTHERAPY_SHOCKS_DELIVERED_TOTAL: 0
MDC_IDC_STAT_TACHYTHERAPY_TOTAL_DTM_END: NORMAL
MDC_IDC_STAT_TACHYTHERAPY_TOTAL_DTM_START: NORMAL

## 2021-08-23 PROCEDURE — 93295 DEV INTERROG REMOTE 1/2/MLT: CPT | Performed by: INTERNAL MEDICINE

## 2021-08-23 PROCEDURE — 93296 REM INTERROG EVL PM/IDS: CPT | Performed by: INTERNAL MEDICINE

## 2021-11-16 NOTE — TELEPHONE ENCOUNTER
Phone call to patient to confirmed preferred pharmacy since faxed request from [facility] not legible and did not come up in system with attempts to refill - spoke to his wife who provided correct information - informed her that patient would need to reschedule yrly follow-up to obtain further refills - understanding verbalized.  mg

## 2022-01-01 ENCOUNTER — ANCILLARY PROCEDURE (OUTPATIENT)
Dept: CARDIOLOGY | Facility: CLINIC | Age: 82
End: 2022-01-01
Attending: INTERNAL MEDICINE
Payer: MEDICARE

## 2022-01-01 ENCOUNTER — LAB REQUISITION (OUTPATIENT)
Dept: LAB | Facility: CLINIC | Age: 82
End: 2022-01-01
Payer: MEDICARE

## 2022-01-01 ENCOUNTER — TELEPHONE (OUTPATIENT)
Dept: CARDIOLOGY | Facility: CLINIC | Age: 82
End: 2022-01-01
Payer: MEDICARE

## 2022-01-01 ENCOUNTER — OFFICE VISIT (OUTPATIENT)
Dept: CARDIOLOGY | Facility: CLINIC | Age: 82
End: 2022-01-01
Attending: NURSE PRACTITIONER
Payer: MEDICARE

## 2022-01-01 ENCOUNTER — LAB (OUTPATIENT)
Dept: LAB | Facility: HOSPITAL | Age: 82
End: 2022-01-01
Payer: MEDICARE

## 2022-01-01 ENCOUNTER — LAB (OUTPATIENT)
Dept: LAB | Facility: CLINIC | Age: 82
End: 2022-01-01
Attending: INTERNAL MEDICINE
Payer: MEDICARE

## 2022-01-01 ENCOUNTER — ANCILLARY PROCEDURE (OUTPATIENT)
Dept: CARDIOLOGY | Facility: CLINIC | Age: 82
End: 2022-01-01
Payer: MEDICARE

## 2022-01-01 ENCOUNTER — MYC MEDICAL ADVICE (OUTPATIENT)
Dept: CARDIOLOGY | Facility: CLINIC | Age: 82
End: 2022-01-01

## 2022-01-01 ENCOUNTER — APPOINTMENT (OUTPATIENT)
Dept: CT IMAGING | Facility: HOSPITAL | Age: 82
End: 2022-01-01
Attending: PHYSICIAN ASSISTANT
Payer: MEDICARE

## 2022-01-01 ENCOUNTER — TELEPHONE (OUTPATIENT)
Dept: CARDIOLOGY | Facility: CLINIC | Age: 82
End: 2022-01-01

## 2022-01-01 ENCOUNTER — HOSPITAL ENCOUNTER (EMERGENCY)
Facility: HOSPITAL | Age: 82
Discharge: HOME | End: 2022-05-03
Attending: STUDENT IN AN ORGANIZED HEALTH CARE EDUCATION/TRAINING PROGRAM | Admitting: STUDENT IN AN ORGANIZED HEALTH CARE EDUCATION/TRAINING PROGRAM
Payer: MEDICARE

## 2022-01-01 ENCOUNTER — OFFICE VISIT (OUTPATIENT)
Dept: CARDIOLOGY | Facility: CLINIC | Age: 82
End: 2022-01-01
Attending: INTERNAL MEDICINE

## 2022-01-01 ENCOUNTER — OFFICE VISIT (OUTPATIENT)
Dept: CARDIOLOGY | Facility: CLINIC | Age: 82
End: 2022-01-01
Attending: NURSE PRACTITIONER

## 2022-01-01 ENCOUNTER — HOSPITAL ENCOUNTER (EMERGENCY)
Facility: HOSPITAL | Age: 82
Discharge: HOME | End: 2022-04-26
Attending: EMERGENCY MEDICINE | Admitting: EMERGENCY MEDICINE
Payer: MEDICARE

## 2022-01-01 ENCOUNTER — HOSPITAL ENCOUNTER (OUTPATIENT)
Dept: CARDIOLOGY | Facility: CLINIC | Age: 82
Discharge: HOME | End: 2022-04-06
Attending: INTERNAL MEDICINE
Payer: MEDICARE

## 2022-01-01 ENCOUNTER — HEALTH MAINTENANCE LETTER (OUTPATIENT)
Age: 82
End: 2022-01-01

## 2022-01-01 ENCOUNTER — LAB (OUTPATIENT)
Dept: CARDIOLOGY | Facility: CLINIC | Age: 82
End: 2022-01-01

## 2022-01-01 VITALS
SYSTOLIC BLOOD PRESSURE: 106 MMHG | HEART RATE: 52 BPM | BODY MASS INDEX: 19.34 KG/M2 | OXYGEN SATURATION: 99 % | DIASTOLIC BLOOD PRESSURE: 52 MMHG | RESPIRATION RATE: 16 BRPM | WEIGHT: 134.8 LBS

## 2022-01-01 VITALS
RESPIRATION RATE: 16 BRPM | HEART RATE: 46 BPM | SYSTOLIC BLOOD PRESSURE: 118 MMHG | WEIGHT: 133 LBS | HEIGHT: 70 IN | DIASTOLIC BLOOD PRESSURE: 60 MMHG | BODY MASS INDEX: 19.04 KG/M2

## 2022-01-01 VITALS
BODY MASS INDEX: 21.3 KG/M2 | HEART RATE: 64 BPM | RESPIRATION RATE: 16 BRPM | WEIGHT: 148.8 LBS | SYSTOLIC BLOOD PRESSURE: 132 MMHG | DIASTOLIC BLOOD PRESSURE: 70 MMHG | HEIGHT: 70 IN

## 2022-01-01 VITALS
RESPIRATION RATE: 20 BRPM | BODY MASS INDEX: 21.05 KG/M2 | OXYGEN SATURATION: 99 % | WEIGHT: 147 LBS | TEMPERATURE: 98.1 F | HEIGHT: 70 IN | HEART RATE: 65 BPM | SYSTOLIC BLOOD PRESSURE: 154 MMHG | DIASTOLIC BLOOD PRESSURE: 71 MMHG

## 2022-01-01 VITALS
DIASTOLIC BLOOD PRESSURE: 50 MMHG | WEIGHT: 133.6 LBS | BODY MASS INDEX: 19.13 KG/M2 | HEIGHT: 70 IN | SYSTOLIC BLOOD PRESSURE: 112 MMHG | RESPIRATION RATE: 24 BRPM | HEART RATE: 84 BPM

## 2022-01-01 VITALS
DIASTOLIC BLOOD PRESSURE: 61 MMHG | BODY MASS INDEX: 21.05 KG/M2 | HEIGHT: 70 IN | WEIGHT: 147 LBS | RESPIRATION RATE: 16 BRPM | HEART RATE: 43 BPM | SYSTOLIC BLOOD PRESSURE: 119 MMHG | TEMPERATURE: 97.9 F | OXYGEN SATURATION: 97 %

## 2022-01-01 DIAGNOSIS — I48.20 ATRIAL FIBRILLATION, CHRONIC (H): Chronic | ICD-10-CM

## 2022-01-01 DIAGNOSIS — I50.22 CHRONIC SYSTOLIC HEART FAILURE (H): ICD-10-CM

## 2022-01-01 DIAGNOSIS — R82.998 OTHER ABNORMAL FINDINGS IN URINE: ICD-10-CM

## 2022-01-01 DIAGNOSIS — N30.00 ACUTE CYSTITIS WITHOUT HEMATURIA: ICD-10-CM

## 2022-01-01 DIAGNOSIS — R33.9 URINARY RETENTION: ICD-10-CM

## 2022-01-01 DIAGNOSIS — I50.23 ACUTE ON CHRONIC SYSTOLIC CONGESTIVE HEART FAILURE (H): Primary | ICD-10-CM

## 2022-01-01 DIAGNOSIS — I42.0 DILATED CARDIOMYOPATHY (H): ICD-10-CM

## 2022-01-01 DIAGNOSIS — I50.22 CHRONIC SYSTOLIC HEART FAILURE (H): Primary | ICD-10-CM

## 2022-01-01 DIAGNOSIS — I48.20 ATRIAL FIBRILLATION, CHRONIC (H): ICD-10-CM

## 2022-01-01 DIAGNOSIS — I10 PRIMARY HYPERTENSION: Chronic | ICD-10-CM

## 2022-01-01 DIAGNOSIS — I42.9 CARDIOMYOPATHY (H): ICD-10-CM

## 2022-01-01 DIAGNOSIS — R93.1 LEFT VENTRICULAR EJECTION FRACTION LESS THAN 40%: ICD-10-CM

## 2022-01-01 DIAGNOSIS — Z95.810 ICD (IMPLANTABLE CARDIOVERTER-DEFIBRILLATOR) IN PLACE: ICD-10-CM

## 2022-01-01 DIAGNOSIS — R06.02 SHORTNESS OF BREATH: Primary | ICD-10-CM

## 2022-01-01 DIAGNOSIS — I42.9 SECONDARY CARDIOMYOPATHY (H): Primary | ICD-10-CM

## 2022-01-01 DIAGNOSIS — R33.8 ACUTE URINARY RETENTION: ICD-10-CM

## 2022-01-01 DIAGNOSIS — I50.9 HEART FAILURE, UNSPECIFIED (H): ICD-10-CM

## 2022-01-01 DIAGNOSIS — I50.22 CHRONIC SYSTOLIC (CONGESTIVE) HEART FAILURE (H): ICD-10-CM

## 2022-01-01 DIAGNOSIS — I42.9 CARDIOMYOPATHY, UNSPECIFIED (H): ICD-10-CM

## 2022-01-01 DIAGNOSIS — R06.02 SHORTNESS OF BREATH: ICD-10-CM

## 2022-01-01 DIAGNOSIS — N40.0 BENIGN PROSTATIC HYPERPLASIA WITHOUT LOWER URINARY TRACT SYMPTOMS: ICD-10-CM

## 2022-01-01 DIAGNOSIS — I48.20 ATRIAL FIBRILLATION, CHRONIC (H): Primary | ICD-10-CM

## 2022-01-01 DIAGNOSIS — I42.9 SECONDARY CARDIOMYOPATHY (H): ICD-10-CM

## 2022-01-01 DIAGNOSIS — K40.90 UNILATERAL INGUINAL HERNIA WITHOUT OBSTRUCTION OR GANGRENE, RECURRENCE NOT SPECIFIED: ICD-10-CM

## 2022-01-01 LAB
ALBUMIN SERPL BCG-MCNC: 3.8 G/DL (ref 3.5–5.2)
ALBUMIN SERPL BCG-MCNC: 4.2 G/DL (ref 3.5–5.2)
ALBUMIN SERPL-MCNC: 3.5 G/DL (ref 3.5–5)
ALBUMIN SERPL-MCNC: 3.8 G/DL (ref 3.5–5)
ALBUMIN SERPL-MCNC: 3.8 G/DL (ref 3.5–5)
ALBUMIN UR-MCNC: 10 MG/DL
ALBUMIN UR-MCNC: 10 MG/DL
ALBUMIN UR-MCNC: 20 MG/DL
ALP SERPL-CCNC: 126 U/L (ref 40–129)
ALP SERPL-CCNC: 74 U/L (ref 45–120)
ALP SERPL-CCNC: 82 U/L (ref 40–129)
ALP SERPL-CCNC: 84 U/L (ref 45–120)
ALP SERPL-CCNC: 91 U/L (ref 45–120)
ALT SERPL W P-5'-P-CCNC: 16 U/L (ref 0–45)
ALT SERPL W P-5'-P-CCNC: 16 U/L (ref 10–50)
ALT SERPL W P-5'-P-CCNC: 20 U/L (ref 0–45)
ALT SERPL W P-5'-P-CCNC: 21 U/L (ref 10–50)
ALT SERPL W P-5'-P-CCNC: 24 U/L (ref 0–45)
AMORPH CRY #/AREA URNS HPF: ABNORMAL /HPF
ANION GAP SERPL CALCULATED.3IONS-SCNC: 10 MMOL/L (ref 5–18)
ANION GAP SERPL CALCULATED.3IONS-SCNC: 10 MMOL/L (ref 7–15)
ANION GAP SERPL CALCULATED.3IONS-SCNC: 10 MMOL/L (ref 7–15)
ANION GAP SERPL CALCULATED.3IONS-SCNC: 11 MMOL/L (ref 5–18)
ANION GAP SERPL CALCULATED.3IONS-SCNC: 12 MMOL/L (ref 5–18)
ANION GAP SERPL CALCULATED.3IONS-SCNC: 12 MMOL/L (ref 5–18)
ANION GAP SERPL CALCULATED.3IONS-SCNC: 15 MMOL/L (ref 5–18)
ANION GAP SERPL CALCULATED.3IONS-SCNC: 7 MMOL/L (ref 5–18)
ANION GAP SERPL CALCULATED.3IONS-SCNC: 7 MMOL/L (ref 5–18)
ANION GAP SERPL CALCULATED.3IONS-SCNC: 8 MMOL/L (ref 5–18)
ANION GAP SERPL CALCULATED.3IONS-SCNC: 9 MMOL/L (ref 5–18)
APPEARANCE UR: ABNORMAL
AST SERPL W P-5'-P-CCNC: 25 U/L (ref 0–40)
AST SERPL W P-5'-P-CCNC: 25 U/L (ref 10–50)
AST SERPL W P-5'-P-CCNC: 26 U/L (ref 0–40)
AST SERPL W P-5'-P-CCNC: 27 U/L (ref 0–40)
AST SERPL W P-5'-P-CCNC: 31 U/L (ref 10–50)
BACTERIA #/AREA URNS HPF: ABNORMAL /HPF
BACTERIA UR CULT: ABNORMAL
BACTERIA UR CULT: NO GROWTH
BACTERIA UR CULT: NO GROWTH
BASOPHILS # BLD AUTO: 0 10E3/UL (ref 0–0.2)
BASOPHILS # BLD AUTO: 0.1 10E3/UL (ref 0–0.2)
BASOPHILS # BLD AUTO: 0.1 10E3/UL (ref 0–0.2)
BASOPHILS NFR BLD AUTO: 0 %
BASOPHILS NFR BLD AUTO: 1 %
BASOPHILS NFR BLD AUTO: 1 %
BILIRUB SERPL-MCNC: 0.8 MG/DL
BILIRUB SERPL-MCNC: 0.9 MG/DL (ref 0–1)
BILIRUB SERPL-MCNC: 1.3 MG/DL
BILIRUB SERPL-MCNC: 1.6 MG/DL (ref 0–1)
BILIRUB SERPL-MCNC: 2.4 MG/DL (ref 0–1)
BILIRUB UR QL STRIP: NEGATIVE
BNP SERPL-MCNC: 1079 PG/ML (ref 0–88)
BNP SERPL-MCNC: 1268 PG/ML (ref 0–88)
BNP SERPL-MCNC: 1910 PG/ML (ref 0–86)
BNP SERPL-MCNC: 2264 PG/ML (ref 0–88)
BUN SERPL-MCNC: 18 MG/DL (ref 8–28)
BUN SERPL-MCNC: 18.5 MG/DL (ref 8–23)
BUN SERPL-MCNC: 21 MG/DL (ref 8–28)
BUN SERPL-MCNC: 23 MG/DL (ref 8–28)
BUN SERPL-MCNC: 24 MG/DL (ref 8–28)
BUN SERPL-MCNC: 26 MG/DL (ref 8–28)
BUN SERPL-MCNC: 26.1 MG/DL (ref 8–23)
BUN SERPL-MCNC: 28 MG/DL (ref 8–28)
BUN SERPL-MCNC: 30 MG/DL (ref 8–28)
BUN SERPL-MCNC: 31 MG/DL (ref 8–28)
BUN SERPL-MCNC: 32 MG/DL (ref 8–28)
CALCIUM SERPL-MCNC: 10 MG/DL (ref 8.5–10.5)
CALCIUM SERPL-MCNC: 10 MG/DL (ref 8.5–10.5)
CALCIUM SERPL-MCNC: 10.1 MG/DL (ref 8.5–10.5)
CALCIUM SERPL-MCNC: 9.1 MG/DL (ref 8.5–10.5)
CALCIUM SERPL-MCNC: 9.4 MG/DL (ref 8.5–10.5)
CALCIUM SERPL-MCNC: 9.6 MG/DL (ref 8.8–10.2)
CALCIUM SERPL-MCNC: 9.7 MG/DL (ref 8.5–10.5)
CALCIUM SERPL-MCNC: 9.7 MG/DL (ref 8.5–10.5)
CALCIUM SERPL-MCNC: 9.8 MG/DL (ref 8.5–10.5)
CALCIUM SERPL-MCNC: 9.9 MG/DL (ref 8.5–10.5)
CALCIUM SERPL-MCNC: 9.9 MG/DL (ref 8.8–10.2)
CHLORIDE BLD-SCNC: 100 MMOL/L (ref 98–107)
CHLORIDE BLD-SCNC: 101 MMOL/L (ref 98–107)
CHLORIDE BLD-SCNC: 103 MMOL/L (ref 98–107)
CHLORIDE BLD-SCNC: 104 MMOL/L (ref 98–107)
CHLORIDE BLD-SCNC: 108 MMOL/L (ref 98–107)
CHLORIDE BLD-SCNC: 94 MMOL/L (ref 98–107)
CHLORIDE BLD-SCNC: 97 MMOL/L (ref 98–107)
CHLORIDE BLD-SCNC: 97 MMOL/L (ref 98–107)
CHLORIDE BLD-SCNC: 98 MMOL/L (ref 98–107)
CHLORIDE SERPL-SCNC: 95 MMOL/L (ref 98–107)
CHLORIDE SERPL-SCNC: 98 MMOL/L (ref 98–107)
CHOLEST SERPL-MCNC: 123 MG/DL
CHOLEST SERPL-MCNC: 141 MG/DL
CO2 SERPL-SCNC: 25 MMOL/L (ref 22–31)
CO2 SERPL-SCNC: 26 MMOL/L (ref 22–31)
CO2 SERPL-SCNC: 28 MMOL/L (ref 22–31)
CO2 SERPL-SCNC: 28 MMOL/L (ref 22–31)
CO2 SERPL-SCNC: 30 MMOL/L (ref 22–31)
CO2 SERPL-SCNC: 32 MMOL/L (ref 22–31)
CO2 SERPL-SCNC: 32 MMOL/L (ref 22–31)
CO2 SERPL-SCNC: 33 MMOL/L (ref 22–31)
CO2 SERPL-SCNC: 34 MMOL/L (ref 22–31)
COLOR UR AUTO: YELLOW
CREAT SERPL-MCNC: 0.84 MG/DL (ref 0.7–1.3)
CREAT SERPL-MCNC: 0.87 MG/DL (ref 0.7–1.3)
CREAT SERPL-MCNC: 0.88 MG/DL (ref 0.7–1.3)
CREAT SERPL-MCNC: 0.93 MG/DL (ref 0.7–1.3)
CREAT SERPL-MCNC: 0.96 MG/DL (ref 0.7–1.3)
CREAT SERPL-MCNC: 1.01 MG/DL (ref 0.67–1.17)
CREAT SERPL-MCNC: 1.01 MG/DL (ref 0.7–1.3)
CREAT SERPL-MCNC: 1.06 MG/DL (ref 0.7–1.3)
CREAT SERPL-MCNC: 1.09 MG/DL (ref 0.7–1.3)
CREAT SERPL-MCNC: 1.1 MG/DL (ref 0.7–1.3)
CREAT SERPL-MCNC: 1.15 MG/DL (ref 0.67–1.17)
DEPRECATED HCO3 PLAS-SCNC: 31 MMOL/L (ref 22–29)
DEPRECATED HCO3 PLAS-SCNC: 34 MMOL/L (ref 22–29)
EOSINOPHIL # BLD AUTO: 0 10E3/UL (ref 0–0.7)
EOSINOPHIL # BLD AUTO: 0 10E3/UL (ref 0–0.7)
EOSINOPHIL # BLD AUTO: 0.1 10E3/UL (ref 0–0.7)
EOSINOPHIL NFR BLD AUTO: 0 %
EOSINOPHIL NFR BLD AUTO: 0 %
EOSINOPHIL NFR BLD AUTO: 1 %
EOSINOPHIL NFR BLD AUTO: 1 %
EOSINOPHIL NFR BLD AUTO: 2 %
ERYTHROCYTE [DISTWIDTH] IN BLOOD BY AUTOMATED COUNT: 12 % (ref 10–15)
ERYTHROCYTE [DISTWIDTH] IN BLOOD BY AUTOMATED COUNT: 12.6 % (ref 10–15)
ERYTHROCYTE [DISTWIDTH] IN BLOOD BY AUTOMATED COUNT: 12.8 % (ref 10–15)
ERYTHROCYTE [DISTWIDTH] IN BLOOD BY AUTOMATED COUNT: 12.8 % (ref 10–15)
ERYTHROCYTE [DISTWIDTH] IN BLOOD BY AUTOMATED COUNT: 13 % (ref 10–15)
ERYTHROCYTE [DISTWIDTH] IN BLOOD BY AUTOMATED COUNT: 13 % (ref 10–15)
FASTING STATUS PATIENT QL REPORTED: NORMAL
GFR SERPL CREATININE-BSD FRML MDRD: 64 ML/MIN/1.73M2
GFR SERPL CREATININE-BSD FRML MDRD: 67 ML/MIN/1.73M2
GFR SERPL CREATININE-BSD FRML MDRD: 68 ML/MIN/1.73M2
GFR SERPL CREATININE-BSD FRML MDRD: 71 ML/MIN/1.73M2
GFR SERPL CREATININE-BSD FRML MDRD: 75 ML/MIN/1.73M2
GFR SERPL CREATININE-BSD FRML MDRD: 75 ML/MIN/1.73M2
GFR SERPL CREATININE-BSD FRML MDRD: 79 ML/MIN/1.73M2
GFR SERPL CREATININE-BSD FRML MDRD: 82 ML/MIN/1.73M2
GFR SERPL CREATININE-BSD FRML MDRD: 86 ML/MIN/1.73M2
GFR SERPL CREATININE-BSD FRML MDRD: 87 ML/MIN/1.73M2
GFR SERPL CREATININE-BSD FRML MDRD: 88 ML/MIN/1.73M2
GLUCOSE BLD-MCNC: 103 MG/DL (ref 70–125)
GLUCOSE BLD-MCNC: 104 MG/DL (ref 70–125)
GLUCOSE BLD-MCNC: 112 MG/DL (ref 70–125)
GLUCOSE BLD-MCNC: 112 MG/DL (ref 70–125)
GLUCOSE BLD-MCNC: 116 MG/DL (ref 70–125)
GLUCOSE BLD-MCNC: 128 MG/DL (ref 70–125)
GLUCOSE BLD-MCNC: 92 MG/DL (ref 70–125)
GLUCOSE BLD-MCNC: 97 MG/DL (ref 70–125)
GLUCOSE BLD-MCNC: 97 MG/DL (ref 70–125)
GLUCOSE SERPL-MCNC: 102 MG/DL (ref 70–99)
GLUCOSE SERPL-MCNC: 94 MG/DL (ref 70–99)
GLUCOSE UR STRIP-MCNC: NEGATIVE MG/DL
HCT VFR BLD AUTO: 38.2 % (ref 40–53)
HCT VFR BLD AUTO: 39 % (ref 40–53)
HCT VFR BLD AUTO: 40 % (ref 40–53)
HCT VFR BLD AUTO: 40.9 % (ref 40–53)
HCT VFR BLD AUTO: 42.6 % (ref 40–53)
HCT VFR BLD AUTO: 45.4 % (ref 40–53)
HDLC SERPL-MCNC: 48 MG/DL
HDLC SERPL-MCNC: 51 MG/DL
HGB BLD-MCNC: 12.2 G/DL (ref 13.3–17.7)
HGB BLD-MCNC: 12.8 G/DL (ref 13.3–17.7)
HGB BLD-MCNC: 12.8 G/DL (ref 13.3–17.7)
HGB BLD-MCNC: 13.2 G/DL (ref 13.3–17.7)
HGB BLD-MCNC: 14.2 G/DL (ref 13.3–17.7)
HGB BLD-MCNC: 14.8 G/DL (ref 13.3–17.7)
HGB UR QL STRIP: ABNORMAL
HYALINE CASTS: 19 /LPF
HYALINE CASTS: 3 /LPF
HYALINE CASTS: 6 /LPF
IMM GRANULOCYTES # BLD: 0 10E3/UL
IMM GRANULOCYTES # BLD: 0 10E3/UL
IMM GRANULOCYTES # BLD: 0.1 10E3/UL
IMM GRANULOCYTES NFR BLD: 0 %
IMM GRANULOCYTES NFR BLD: 0 %
IMM GRANULOCYTES NFR BLD: 1 %
KETONES UR STRIP-MCNC: NEGATIVE MG/DL
LDLC SERPL CALC-MCNC: 63 MG/DL
LDLC SERPL CALC-MCNC: 76 MG/DL
LEUKOCYTE ESTERASE UR QL STRIP: ABNORMAL
LYMPHOCYTES # BLD AUTO: 0.6 10E3/UL (ref 0.8–5.3)
LYMPHOCYTES # BLD AUTO: 0.7 10E3/UL (ref 0.8–5.3)
LYMPHOCYTES # BLD AUTO: 0.7 10E3/UL (ref 0.8–5.3)
LYMPHOCYTES # BLD AUTO: 1 10E3/UL (ref 0.8–5.3)
LYMPHOCYTES # BLD AUTO: 1.2 10E3/UL (ref 0.8–5.3)
LYMPHOCYTES NFR BLD AUTO: 16 %
LYMPHOCYTES NFR BLD AUTO: 6 %
LYMPHOCYTES NFR BLD AUTO: 7 %
LYMPHOCYTES NFR BLD AUTO: 8 %
LYMPHOCYTES NFR BLD AUTO: 8 %
MCH RBC QN AUTO: 33.5 PG (ref 26.5–33)
MCH RBC QN AUTO: 33.6 PG (ref 26.5–33)
MCH RBC QN AUTO: 33.7 PG (ref 26.5–33)
MCH RBC QN AUTO: 34 PG (ref 26.5–33)
MCH RBC QN AUTO: 34.1 PG (ref 26.5–33)
MCH RBC QN AUTO: 34.2 PG (ref 26.5–33)
MCHC RBC AUTO-ENTMCNC: 31.9 G/DL (ref 31.5–36.5)
MCHC RBC AUTO-ENTMCNC: 32 G/DL (ref 31.5–36.5)
MCHC RBC AUTO-ENTMCNC: 32.3 G/DL (ref 31.5–36.5)
MCHC RBC AUTO-ENTMCNC: 32.6 G/DL (ref 31.5–36.5)
MCHC RBC AUTO-ENTMCNC: 32.8 G/DL (ref 31.5–36.5)
MCHC RBC AUTO-ENTMCNC: 33.3 G/DL (ref 31.5–36.5)
MCV RBC AUTO: 102 FL (ref 78–100)
MCV RBC AUTO: 104 FL (ref 78–100)
MCV RBC AUTO: 104 FL (ref 78–100)
MCV RBC AUTO: 105 FL (ref 78–100)
MCV RBC AUTO: 105 FL (ref 78–100)
MCV RBC AUTO: 106 FL (ref 78–100)
MDC_IDC_EPISODE_DTM: NORMAL
MDC_IDC_EPISODE_DURATION: 12 S
MDC_IDC_EPISODE_DURATION: 12 S
MDC_IDC_EPISODE_DURATION: 13 S
MDC_IDC_EPISODE_DURATION: 17 S
MDC_IDC_EPISODE_DURATION: 47 S
MDC_IDC_EPISODE_ID: NORMAL
MDC_IDC_EPISODE_TYPE: NORMAL
MDC_IDC_EPISODE_VENDOR_TYPE: NORMAL
MDC_IDC_LEAD_IMPLANT_DT: NORMAL
MDC_IDC_LEAD_LOCATION: NORMAL
MDC_IDC_LEAD_LOCATION_DETAIL_1: NORMAL
MDC_IDC_LEAD_MFG: NORMAL
MDC_IDC_LEAD_MODEL: NORMAL
MDC_IDC_LEAD_POLARITY_TYPE: NORMAL
MDC_IDC_LEAD_SERIAL: NORMAL
MDC_IDC_LEAD_SPECIAL_FUNCTION: NORMAL
MDC_IDC_MSMT_BATTERY_DTM: NORMAL
MDC_IDC_MSMT_BATTERY_REMAINING_LONGEVITY: 126 MO
MDC_IDC_MSMT_BATTERY_REMAINING_LONGEVITY: 132 MO
MDC_IDC_MSMT_BATTERY_REMAINING_LONGEVITY: 138 MO
MDC_IDC_MSMT_BATTERY_REMAINING_LONGEVITY: 90 MO
MDC_IDC_MSMT_BATTERY_REMAINING_PERCENTAGE: 100 %
MDC_IDC_MSMT_BATTERY_REMAINING_PERCENTAGE: 84 %
MDC_IDC_MSMT_BATTERY_STATUS: NORMAL
MDC_IDC_MSMT_CAP_CHARGE_DTM: NORMAL
MDC_IDC_MSMT_CAP_CHARGE_ENERGY: 41 J
MDC_IDC_MSMT_CAP_CHARGE_ENERGY: 41 J
MDC_IDC_MSMT_CAP_CHARGE_TIME: 10 S
MDC_IDC_MSMT_CAP_CHARGE_TIME: 10 S
MDC_IDC_MSMT_CAP_CHARGE_TIME: 10.9 S
MDC_IDC_MSMT_CAP_CHARGE_TYPE: NORMAL
MDC_IDC_MSMT_LEADCHNL_RV_IMPEDANCE_VALUE: 340 OHM
MDC_IDC_MSMT_LEADCHNL_RV_IMPEDANCE_VALUE: 352 OHM
MDC_IDC_MSMT_LEADCHNL_RV_IMPEDANCE_VALUE: 376 OHM
MDC_IDC_MSMT_LEADCHNL_RV_IMPEDANCE_VALUE: 388 OHM
MDC_IDC_MSMT_LEADCHNL_RV_LEAD_CHANNEL_STATUS: NORMAL
MDC_IDC_MSMT_LEADCHNL_RV_PACING_THRESHOLD_AMPLITUDE: 0.7 V
MDC_IDC_MSMT_LEADCHNL_RV_PACING_THRESHOLD_AMPLITUDE: 1.2 V
MDC_IDC_MSMT_LEADCHNL_RV_PACING_THRESHOLD_PULSEWIDTH: 0.5 MS
MDC_IDC_MSMT_LEADCHNL_RV_SENSING_INTR_AMPL: 8.5 MV
MDC_IDC_PG_IMPLANT_DTM: NORMAL
MDC_IDC_PG_MFG: NORMAL
MDC_IDC_PG_MODEL: NORMAL
MDC_IDC_PG_SERIAL: NORMAL
MDC_IDC_PG_TYPE: NORMAL
MDC_IDC_SESS_CLINIC_NAME: NORMAL
MDC_IDC_SESS_DTM: NORMAL
MDC_IDC_SESS_TYPE: NORMAL
MDC_IDC_SET_BRADY_LOWRATE: 40 {BEATS}/MIN
MDC_IDC_SET_BRADY_LOWRATE: 80 {BEATS}/MIN
MDC_IDC_SET_BRADY_MODE: NORMAL
MDC_IDC_SET_LEADCHNL_RV_PACING_AMPLITUDE: 1.5 V
MDC_IDC_SET_LEADCHNL_RV_PACING_AMPLITUDE: 2.4 V
MDC_IDC_SET_LEADCHNL_RV_PACING_POLARITY: NORMAL
MDC_IDC_SET_LEADCHNL_RV_PACING_PULSEWIDTH: 0.5 MS
MDC_IDC_SET_LEADCHNL_RV_SENSING_ADAPTATION_MODE: NORMAL
MDC_IDC_SET_LEADCHNL_RV_SENSING_POLARITY: NORMAL
MDC_IDC_SET_LEADCHNL_RV_SENSING_SENSITIVITY: 0.6 MV
MDC_IDC_SET_ZONE_DETECTION_INTERVAL: 300 MS
MDC_IDC_SET_ZONE_DETECTION_INTERVAL: 353 MS
MDC_IDC_SET_ZONE_DETECTION_INTERVAL: 400 MS
MDC_IDC_SET_ZONE_TYPE: NORMAL
MDC_IDC_SET_ZONE_VENDOR_TYPE: NORMAL
MDC_IDC_STAT_AT_MODE_SW_COUNT: 0
MDC_IDC_STAT_BRADY_DTM_END: NORMAL
MDC_IDC_STAT_BRADY_DTM_START: NORMAL
MDC_IDC_STAT_BRADY_RV_PERCENT_PACED: 37 %
MDC_IDC_STAT_BRADY_RV_PERCENT_PACED: 66 %
MDC_IDC_STAT_BRADY_RV_PERCENT_PACED: 67 %
MDC_IDC_STAT_BRADY_RV_PERCENT_PACED: 70 %
MDC_IDC_STAT_EPISODE_RECENT_COUNT: 0
MDC_IDC_STAT_EPISODE_RECENT_COUNT: 1
MDC_IDC_STAT_EPISODE_RECENT_COUNT: 2
MDC_IDC_STAT_EPISODE_RECENT_COUNT: 3
MDC_IDC_STAT_EPISODE_RECENT_COUNT: 4
MDC_IDC_STAT_EPISODE_RECENT_COUNT: 6
MDC_IDC_STAT_EPISODE_RECENT_COUNT: 9
MDC_IDC_STAT_EPISODE_RECENT_COUNT_DTM_END: NORMAL
MDC_IDC_STAT_EPISODE_RECENT_COUNT_DTM_START: NORMAL
MDC_IDC_STAT_EPISODE_TYPE: NORMAL
MDC_IDC_STAT_EPISODE_VENDOR_TYPE: NORMAL
MDC_IDC_STAT_TACHYTHERAPY_ATP_DELIVERED_RECENT: 0
MDC_IDC_STAT_TACHYTHERAPY_ATP_DELIVERED_RECENT: 0
MDC_IDC_STAT_TACHYTHERAPY_ATP_DELIVERED_RECENT: 1
MDC_IDC_STAT_TACHYTHERAPY_ATP_DELIVERED_RECENT: 1
MDC_IDC_STAT_TACHYTHERAPY_ATP_DELIVERED_TOTAL: 0
MDC_IDC_STAT_TACHYTHERAPY_ATP_DELIVERED_TOTAL: 0
MDC_IDC_STAT_TACHYTHERAPY_ATP_DELIVERED_TOTAL: 1
MDC_IDC_STAT_TACHYTHERAPY_ATP_DELIVERED_TOTAL: 1
MDC_IDC_STAT_TACHYTHERAPY_RECENT_DTM_END: NORMAL
MDC_IDC_STAT_TACHYTHERAPY_RECENT_DTM_START: NORMAL
MDC_IDC_STAT_TACHYTHERAPY_SHOCKS_ABORTED_RECENT: 0
MDC_IDC_STAT_TACHYTHERAPY_SHOCKS_ABORTED_TOTAL: 0
MDC_IDC_STAT_TACHYTHERAPY_SHOCKS_DELIVERED_RECENT: 0
MDC_IDC_STAT_TACHYTHERAPY_SHOCKS_DELIVERED_RECENT: 0
MDC_IDC_STAT_TACHYTHERAPY_SHOCKS_DELIVERED_RECENT: 1
MDC_IDC_STAT_TACHYTHERAPY_SHOCKS_DELIVERED_RECENT: 2
MDC_IDC_STAT_TACHYTHERAPY_SHOCKS_DELIVERED_TOTAL: 0
MDC_IDC_STAT_TACHYTHERAPY_SHOCKS_DELIVERED_TOTAL: 0
MDC_IDC_STAT_TACHYTHERAPY_SHOCKS_DELIVERED_TOTAL: 1
MDC_IDC_STAT_TACHYTHERAPY_SHOCKS_DELIVERED_TOTAL: 2
MDC_IDC_STAT_TACHYTHERAPY_TOTAL_DTM_END: NORMAL
MDC_IDC_STAT_TACHYTHERAPY_TOTAL_DTM_START: NORMAL
MONOCYTES # BLD AUTO: 0.7 10E3/UL (ref 0–1.3)
MONOCYTES # BLD AUTO: 0.9 10E3/UL (ref 0–1.3)
MONOCYTES # BLD AUTO: 1 10E3/UL (ref 0–1.3)
MONOCYTES # BLD AUTO: 1.2 10E3/UL (ref 0–1.3)
MONOCYTES # BLD AUTO: 1.7 10E3/UL (ref 0–1.3)
MONOCYTES NFR BLD AUTO: 10 %
MONOCYTES NFR BLD AUTO: 11 %
MONOCYTES NFR BLD AUTO: 12 %
MONOCYTES NFR BLD AUTO: 13 %
MONOCYTES NFR BLD AUTO: 9 %
MUCOUS THREADS #/AREA URNS LPF: PRESENT /LPF
NEUTROPHILS # BLD AUTO: 5.2 10E3/UL (ref 1.6–8.3)
NEUTROPHILS # BLD AUTO: 6.6 10E3/UL (ref 1.6–8.3)
NEUTROPHILS # BLD AUTO: 7.3 10E3/UL (ref 1.6–8.3)
NEUTROPHILS # BLD AUTO: 9.5 10E3/UL (ref 1.6–8.3)
NEUTROPHILS # BLD AUTO: 9.6 10E3/UL (ref 1.6–8.3)
NEUTROPHILS NFR BLD AUTO: 69 %
NEUTROPHILS NFR BLD AUTO: 77 %
NEUTROPHILS NFR BLD AUTO: 79 %
NEUTROPHILS NFR BLD AUTO: 83 %
NEUTROPHILS NFR BLD AUTO: 83 %
NITRATE UR QL: NEGATIVE
NONHDLC SERPL-MCNC: 90 MG/DL
NRBC # BLD AUTO: 0 10E3/UL
NRBC BLD AUTO-RTO: 0 /100
NT-PROBNP SERPL-MCNC: 5419 PG/ML (ref 0–450)
NT-PROBNP SERPL-MCNC: 6559 PG/ML (ref 0–450)
PH UR STRIP: 6 [PH] (ref 5–7)
PH UR STRIP: 6.5 [PH] (ref 5–7)
PH UR STRIP: 7 [PH] (ref 5–7)
PLATELET # BLD AUTO: 147 10E3/UL (ref 150–450)
PLATELET # BLD AUTO: 157 10E3/UL (ref 150–450)
PLATELET # BLD AUTO: 161 10E3/UL (ref 150–450)
PLATELET # BLD AUTO: 181 10E3/UL (ref 150–450)
PLATELET # BLD AUTO: 182 10E3/UL (ref 150–450)
PLATELET # BLD AUTO: 279 10E3/UL (ref 150–450)
POTASSIUM BLD-SCNC: 3.4 MMOL/L (ref 3.5–5)
POTASSIUM BLD-SCNC: 3.5 MMOL/L (ref 3.5–5)
POTASSIUM BLD-SCNC: 3.7 MMOL/L (ref 3.5–5)
POTASSIUM BLD-SCNC: 3.9 MMOL/L (ref 3.5–5)
POTASSIUM BLD-SCNC: 4.1 MMOL/L (ref 3.5–5)
POTASSIUM BLD-SCNC: 4.2 MMOL/L (ref 3.5–5)
POTASSIUM BLD-SCNC: 4.3 MMOL/L (ref 3.5–5)
POTASSIUM BLD-SCNC: 4.4 MMOL/L (ref 3.5–5)
POTASSIUM BLD-SCNC: 4.6 MMOL/L (ref 3.5–5)
POTASSIUM SERPL-SCNC: 3.4 MMOL/L (ref 3.4–5.3)
POTASSIUM SERPL-SCNC: 4.3 MMOL/L (ref 3.4–5.3)
PROT SERPL-MCNC: 6.3 G/DL (ref 6–8)
PROT SERPL-MCNC: 6.4 G/DL (ref 6–8)
PROT SERPL-MCNC: 6.5 G/DL (ref 6.4–8.3)
PROT SERPL-MCNC: 6.7 G/DL (ref 6.4–8.3)
PROT SERPL-MCNC: 6.7 G/DL (ref 6–8)
RBC # BLD AUTO: 3.62 10E6/UL (ref 4.4–5.9)
RBC # BLD AUTO: 3.75 10E6/UL (ref 4.4–5.9)
RBC # BLD AUTO: 3.82 10E6/UL (ref 4.4–5.9)
RBC # BLD AUTO: 3.93 10E6/UL (ref 4.4–5.9)
RBC # BLD AUTO: 4.18 10E6/UL (ref 4.4–5.9)
RBC # BLD AUTO: 4.33 10E6/UL (ref 4.4–5.9)
RBC URINE: 126 /HPF
RBC URINE: 4 /HPF
RBC URINE: 77 /HPF
SODIUM SERPL-SCNC: 133 MMOL/L (ref 136–145)
SODIUM SERPL-SCNC: 135 MMOL/L (ref 136–145)
SODIUM SERPL-SCNC: 136 MMOL/L (ref 136–145)
SODIUM SERPL-SCNC: 137 MMOL/L (ref 136–145)
SODIUM SERPL-SCNC: 138 MMOL/L (ref 136–145)
SODIUM SERPL-SCNC: 140 MMOL/L (ref 136–145)
SODIUM SERPL-SCNC: 142 MMOL/L (ref 136–145)
SODIUM SERPL-SCNC: 142 MMOL/L (ref 136–145)
SODIUM SERPL-SCNC: 143 MMOL/L (ref 136–145)
SODIUM SERPL-SCNC: 145 MMOL/L (ref 136–145)
SODIUM SERPL-SCNC: 148 MMOL/L (ref 136–145)
SP GR UR STRIP: 1.01 (ref 1–1.03)
SP GR UR STRIP: 1.01 (ref 1–1.03)
SP GR UR STRIP: 1.02 (ref 1–1.03)
SQUAMOUS EPITHELIAL: 1 /HPF
TRANSITIONAL EPI: <1 /HPF
TRANSITIONAL EPI: <1 /HPF
TRIGL SERPL-MCNC: 62 MG/DL
TRIGL SERPL-MCNC: 72 MG/DL
UROBILINOGEN UR STRIP-MCNC: 2 MG/DL
UROBILINOGEN UR STRIP-MCNC: 4 MG/DL
UROBILINOGEN UR STRIP-MCNC: NORMAL MG/DL
WBC # BLD AUTO: 11.6 10E3/UL (ref 4–11)
WBC # BLD AUTO: 12.3 10E3/UL (ref 4–11)
WBC # BLD AUTO: 16.7 10E3/UL (ref 4–11)
WBC # BLD AUTO: 7.5 10E3/UL (ref 4–11)
WBC # BLD AUTO: 8.1 10E3/UL (ref 4–11)
WBC # BLD AUTO: 9 10E3/UL (ref 4–11)
WBC URINE: 12 /HPF
WBC URINE: 34 /HPF
WBC URINE: 46 /HPF

## 2022-01-01 PROCEDURE — 83880 ASSAY OF NATRIURETIC PEPTIDE: CPT

## 2022-01-01 PROCEDURE — 85025 COMPLETE CBC W/AUTO DIFF WBC: CPT | Mod: ORL | Performed by: FAMILY MEDICINE

## 2022-01-01 PROCEDURE — 51702 INSERT TEMP BLADDER CATH: CPT

## 2022-01-01 PROCEDURE — 36415 COLL VENOUS BLD VENIPUNCTURE: CPT | Performed by: NURSE PRACTITIONER

## 2022-01-01 PROCEDURE — 83880 ASSAY OF NATRIURETIC PEPTIDE: CPT | Mod: ORL | Performed by: NURSE PRACTITIONER

## 2022-01-01 PROCEDURE — 83880 ASSAY OF NATRIURETIC PEPTIDE: CPT | Mod: ORL | Performed by: FAMILY MEDICINE

## 2022-01-01 PROCEDURE — 81001 URINALYSIS AUTO W/SCOPE: CPT | Performed by: PHYSICIAN ASSISTANT

## 2022-01-01 PROCEDURE — 85025 COMPLETE CBC W/AUTO DIFF WBC: CPT | Performed by: PHYSICIAN ASSISTANT

## 2022-01-01 PROCEDURE — 83880 ASSAY OF NATRIURETIC PEPTIDE: CPT | Performed by: NURSE PRACTITIONER

## 2022-01-01 PROCEDURE — 80048 BASIC METABOLIC PNL TOTAL CA: CPT

## 2022-01-01 PROCEDURE — 80053 COMPREHEN METABOLIC PANEL: CPT | Mod: ORL | Performed by: FAMILY MEDICINE

## 2022-01-01 PROCEDURE — 93282 PRGRMG EVAL IMPLANTABLE DFB: CPT | Performed by: INTERNAL MEDICINE

## 2022-01-01 PROCEDURE — 93296 REM INTERROG EVL PM/IDS: CPT | Performed by: INTERNAL MEDICINE

## 2022-01-01 PROCEDURE — 80048 BASIC METABOLIC PNL TOTAL CA: CPT | Performed by: NURSE PRACTITIONER

## 2022-01-01 PROCEDURE — 99214 OFFICE O/P EST MOD 30 MIN: CPT | Performed by: NURSE PRACTITIONER

## 2022-01-01 PROCEDURE — 82310 ASSAY OF CALCIUM: CPT | Performed by: EMERGENCY MEDICINE

## 2022-01-01 PROCEDURE — 87086 URINE CULTURE/COLONY COUNT: CPT | Performed by: PHYSICIAN ASSISTANT

## 2022-01-01 PROCEDURE — 80048 BASIC METABOLIC PNL TOTAL CA: CPT | Performed by: PHYSICIAN ASSISTANT

## 2022-01-01 PROCEDURE — 250N000011 HC RX IP 250 OP 636: Performed by: EMERGENCY MEDICINE

## 2022-01-01 PROCEDURE — 85027 COMPLETE CBC AUTOMATED: CPT | Performed by: EMERGENCY MEDICINE

## 2022-01-01 PROCEDURE — 87086 URINE CULTURE/COLONY COUNT: CPT | Mod: ORL | Performed by: FAMILY MEDICINE

## 2022-01-01 PROCEDURE — 93306 TTE W/DOPPLER COMPLETE: CPT

## 2022-01-01 PROCEDURE — 85041 AUTOMATED RBC COUNT: CPT | Performed by: EMERGENCY MEDICINE

## 2022-01-01 PROCEDURE — 80061 LIPID PANEL: CPT | Mod: ORL | Performed by: NURSE PRACTITIONER

## 2022-01-01 PROCEDURE — 81001 URINALYSIS AUTO W/SCOPE: CPT | Performed by: EMERGENCY MEDICINE

## 2022-01-01 PROCEDURE — 87086 URINE CULTURE/COLONY COUNT: CPT | Performed by: EMERGENCY MEDICINE

## 2022-01-01 PROCEDURE — 36415 COLL VENOUS BLD VENIPUNCTURE: CPT | Performed by: EMERGENCY MEDICINE

## 2022-01-01 PROCEDURE — 80061 LIPID PANEL: CPT | Mod: ORL | Performed by: FAMILY MEDICINE

## 2022-01-01 PROCEDURE — 99285 EMERGENCY DEPT VISIT HI MDM: CPT | Mod: 25

## 2022-01-01 PROCEDURE — 93306 TTE W/DOPPLER COMPLETE: CPT | Mod: 26 | Performed by: INTERNAL MEDICINE

## 2022-01-01 PROCEDURE — 36415 COLL VENOUS BLD VENIPUNCTURE: CPT

## 2022-01-01 PROCEDURE — 80053 COMPREHEN METABOLIC PANEL: CPT | Mod: ORL | Performed by: NURSE PRACTITIONER

## 2022-01-01 PROCEDURE — 85025 COMPLETE CBC W/AUTO DIFF WBC: CPT | Mod: ORL | Performed by: NURSE PRACTITIONER

## 2022-01-01 PROCEDURE — 74177 CT ABD & PELVIS W/CONTRAST: CPT

## 2022-01-01 PROCEDURE — 93295 DEV INTERROG REMOTE 1/2/MLT: CPT | Performed by: INTERNAL MEDICINE

## 2022-01-01 PROCEDURE — 99214 OFFICE O/P EST MOD 30 MIN: CPT | Performed by: INTERNAL MEDICINE

## 2022-01-01 PROCEDURE — 96365 THER/PROPH/DIAG IV INF INIT: CPT

## 2022-01-01 PROCEDURE — 36415 COLL VENOUS BLD VENIPUNCTURE: CPT | Performed by: PHYSICIAN ASSISTANT

## 2022-01-01 PROCEDURE — 81001 URINALYSIS AUTO W/SCOPE: CPT | Mod: ORL | Performed by: FAMILY MEDICINE

## 2022-01-01 PROCEDURE — 99215 OFFICE O/P EST HI 40 MIN: CPT | Performed by: NURSE PRACTITIONER

## 2022-01-01 PROCEDURE — 99284 EMERGENCY DEPT VISIT MOD MDM: CPT | Mod: 25

## 2022-01-01 PROCEDURE — 250N000011 HC RX IP 250 OP 636: Performed by: STUDENT IN AN ORGANIZED HEALTH CARE EDUCATION/TRAINING PROGRAM

## 2022-01-01 RX ORDER — ACETAMINOPHEN 500 MG
500 TABLET ORAL EVERY 8 HOURS PRN
COMMUNITY

## 2022-01-01 RX ORDER — FUROSEMIDE 40 MG
TABLET ORAL
Qty: 270 TABLET | Refills: 1 | Status: SHIPPED | OUTPATIENT
Start: 2022-01-01 | End: 2022-01-01

## 2022-01-01 RX ORDER — IOPAMIDOL 755 MG/ML
100 INJECTION, SOLUTION INTRAVASCULAR ONCE
Status: COMPLETED | OUTPATIENT
Start: 2022-01-01 | End: 2022-01-01

## 2022-01-01 RX ORDER — FUROSEMIDE 40 MG
TABLET ORAL
Qty: 180 TABLET | Refills: 0 | Status: SHIPPED | OUTPATIENT
Start: 2022-01-01 | End: 2022-01-01

## 2022-01-01 RX ORDER — LIDOCAINE HYDROCHLORIDE 20 MG/ML
10 JELLY TOPICAL ONCE
Status: DISCONTINUED | OUTPATIENT
Start: 2022-01-01 | End: 2022-01-01 | Stop reason: HOSPADM

## 2022-01-01 RX ORDER — FUROSEMIDE 40 MG
TABLET ORAL
Qty: 180 TABLET | Refills: 1 | Status: SHIPPED | OUTPATIENT
Start: 2022-01-01 | End: 2022-01-01

## 2022-01-01 RX ORDER — FUROSEMIDE 40 MG
40 TABLET ORAL 2 TIMES DAILY
Qty: 180 TABLET | Refills: 0 | Status: SHIPPED | OUTPATIENT
Start: 2022-01-01 | End: 2022-01-01

## 2022-01-01 RX ORDER — CEFTRIAXONE 1 G/1
1 INJECTION, POWDER, FOR SOLUTION INTRAMUSCULAR; INTRAVENOUS ONCE
Status: COMPLETED | OUTPATIENT
Start: 2022-01-01 | End: 2022-01-01

## 2022-01-01 RX ORDER — TAMSULOSIN HYDROCHLORIDE 0.4 MG/1
0.4 CAPSULE ORAL DAILY
Qty: 14 CAPSULE | Refills: 0 | Status: SHIPPED | OUTPATIENT
Start: 2022-01-01 | End: 2022-01-01

## 2022-01-01 RX ORDER — FUROSEMIDE 40 MG
40 TABLET ORAL DAILY
Qty: 90 TABLET | Refills: 0 | Status: SHIPPED | OUTPATIENT
Start: 2022-01-01 | End: 2022-01-01

## 2022-01-01 RX ORDER — FUROSEMIDE 40 MG
TABLET ORAL
Qty: 270 TABLET | Refills: 1 | Status: SHIPPED | OUTPATIENT
Start: 2022-01-01

## 2022-01-01 RX ORDER — FUROSEMIDE 40 MG
TABLET ORAL
Qty: 180 TABLET | Refills: 0
Start: 2022-01-01 | End: 2022-01-01

## 2022-01-01 RX ORDER — SULFAMETHOXAZOLE/TRIMETHOPRIM 800-160 MG
1 TABLET ORAL 2 TIMES DAILY
Qty: 20 TABLET | Refills: 0 | Status: SHIPPED | OUTPATIENT
Start: 2022-01-01 | End: 2022-01-01

## 2022-01-01 RX ADMIN — IOPAMIDOL 100 ML: 755 INJECTION, SOLUTION INTRAVENOUS at 11:23

## 2022-01-01 RX ADMIN — CEFTRIAXONE SODIUM 1 G: 1 INJECTION, POWDER, FOR SOLUTION INTRAMUSCULAR; INTRAVENOUS at 18:06

## 2022-01-01 ASSESSMENT — ENCOUNTER SYMPTOMS
APPETITE CHANGE: 0
DIFFICULTY URINATING: 1
SHORTNESS OF BREATH: 0
LIGHT-HEADEDNESS: 0
ABDOMINAL PAIN: 1
DIZZINESS: 0

## 2022-01-01 ASSESSMENT — MIFFLIN-ST. JEOR: SCORE: 1319.53

## 2022-01-17 NOTE — PATIENT INSTRUCTIONS
Richard Millan,    It was a pleasure to see you today at the [facility].     My recommendations after this visit include:    Same medications  Consider echo    JUSTIN Pineda MD, FACC, JAIRO

## 2022-01-17 NOTE — PROGRESS NOTES
"    Cardiology Progress Note     Assessment:  Chronic systolic heart failure, no fluid overload, functional class II  Dilated cardiomyopathy, moderately depressed LV systolic function, presumably nonischemic  Permanent atrial fibrillation, rate controlled/mildly bradycardic, not anticoagulated per patient's decision  AICD, normal function  Weight loss    Plan:    Continue current cardiac medications including losartan metoprolol and furosemide.  I offered him reassessment of LV systolic function and filling pressure with echo.  He did not want to do it at this point.    I encouraged him to contact his primary physician if he continues to lose weight    Follow-up in 1 year    Subjective:   This is 81 year old male who comes in today for follow-up visit.  He reports no chest pain or heart palpitations.  He gets short of breath with physical activities.  He has not had weight gain, PND, orthopnea.  His weight has declined over 20 pounds in the last year.  He states that he eats normally.  He denies orthostatic lightheadedness.    Review of Systems:   Negative other than history of present illness    Objective:   /60 (BP Location: Right arm, Patient Position: Sitting, Cuff Size: Adult Small)   Pulse (!) 46   Resp 16   Ht 1.778 m (5' 10\")   Wt 60.3 kg (133 lb)   BMI 19.08 kg/m    Physical Exam:  GENERAL: no distress  NECK: No JVD  LUNGS: Clear to auscultation.  CARDIAC: regular rhythm, S1 & S2 normal.  No heaves, thrills, gallops or murmurs.  ABDOMEN: flat, negative hepatosplenomegaly, soft and non-tender.  EXTREMITIES: No evidence of cyanosis, clubbing or edema.    Current Outpatient Medications   Medication Sig Dispense Refill     brimonidine (ALPHAGAN) 0.2 % ophthalmic solution [BRIMONIDINE (ALPHAGAN) 0.2 % OPHTHALMIC SOLUTION] Administer 1 drop to both eyes 2 (two) times a day.       dorzolamide (TRUSOPT) 2 % ophthalmic solution [DORZOLAMIDE (TRUSOPT) 2 % OPHTHALMIC SOLUTION] Administer 1 drop to both eyes " daily.       FOLIC ACID/MULTIVIT-MIN/LUTEIN (CENTRUM SILVER ORAL) [FOLIC ACID/MULTIVIT-MIN/LUTEIN (CENTRUM SILVER ORAL)] Take 1 tablet by mouth daily.       furosemide (LASIX) 20 MG tablet TAKE 1 TABLET(20 MG) BY MOUTH DAILY 90 tablet 0     latanoprost (XALATAN) 0.005 % ophthalmic solution [LATANOPROST (XALATAN) 0.005 % OPHTHALMIC SOLUTION] Administer 1 drop to both eyes daily.  4     losartan (COZAAR) 50 MG tablet Take 1 tablet (50 mg) by mouth daily 90 tablet 0     metoprolol succinate (TOPROL-XL) 25 MG [METOPROLOL SUCCINATE (TOPROL-XL) 25 MG] Take 0.5 tablets (12.5 mg total) by mouth daily. 45 tablet 3     VYZULTA 0.024 % Drop [VYZULTA 0.024 % DROP] INSTILL 1 GTT INTO OU QD         Cardiographics:    AICD interrogation: No therapies, 37 % RV paced(backup pacing set up for40 bpm)     Cardiac MR: September 2015   1. Left ventricle is mildly enlarged with severe left ventricular systolic   dysfunction. There is no abnormal gadolinium enhancement. LVEF: 23%   2. Right ventricle is mildly enlarged with severe right   ventricular systolic dysfunction.   3. Moderate mitral regurgitation.   4. Normal ascending and descending aorta size.      Echo: October 2017  1. The left ventricle is normal in size. Left ventricular systolic performance is moderately reduced. The ejection fraction is estimated to be 35-40%.   2. There is moderate global reduction in left ventricular systolic performance.   3. There is mild concentric increase in left ventricular wall thickness.  4. There is mild aortic insufficiency.   5. There is moderate mitral insufficiency.   6. There is moderate tricuspid insufficiency.   7. Normal right ventricular size and systolic performance.   8. There is moderate to severe left atrial enlargement. There is moderate right atrial enlargement.   9. Right ventricular systolic pressure relative to right atrial pressure is mildly increased.  The pulmonary artery pressure is estimated to be 40-45 mmHg plus right  atrial pressure (the IVC is of fairly normal caliber).      When compared to the prior real-time echocardiogram dated 3 February 2016, there has been a mild decline in left ventricular systolic performance from previous.  There is been an increase in the degree of mitral and tricuspid insufficiency which both appear to be moderate on the current examination.     Lab Results    Chemistry/lipid CBC Cardiac Enzymes/BNP/TSH/INR   No results for input(s): CHOL, HDL, LDL, TRIG, CHOLHDLRATIO in the last 40827 hours.  No results for input(s): LDL in the last 57424 hours.  Recent Labs   Lab Test 07/02/21 0918      POTASSIUM 4.0   CHLORIDE 102   CO2 26   *   BUN 30*   CR 1.04   GFRESTIMATED >60   LAURITA 9.8     Recent Labs   Lab Test 07/02/21  0918 11/13/20  0933 10/10/18  1423   CR 1.04 0.86 0.82     No results for input(s): A1C in the last 43239 hours.       Recent Labs   Lab Test 11/13/20 0933   WBC 7.6   HGB 14.2   HCT 42.7           Recent Labs   Lab Test 11/13/20  0933 10/29/18  1512 05/18/18  1349   HGB 14.2 15.1 14.1    No results for input(s): TROPONINI in the last 49654 hours.  Recent Labs   Lab Test 11/13/20  0933 10/10/18  1423   * 193*     Recent Labs   Lab Test 07/02/21 0918   TSH 0.92     No results for input(s): INR in the last 29288 hours.

## 2022-01-17 NOTE — LETTER
"1/17/2022    Ilia Murray MD  [facility] [address]  [facility] [address]    RE: Richard Osbornsherrie       Dear Colleague,     I had the pleasure of seeing Richard Josephkyrie in the [facility].      Cardiology Progress Note     Assessment:  Chronic systolic heart failure, no fluid overload, functional class II  Dilated cardiomyopathy, moderately depressed LV systolic function, presumably nonischemic  Permanent atrial fibrillation, rate controlled/mildly bradycardic, not anticoagulated per patient's decision  AICD, normal function  Weight loss    Plan:    Continue current cardiac medications including losartan metoprolol and furosemide.  I offered him reassessment of LV systolic function and filling pressure with echo.  He did not want to do it at this point.    I encouraged him to contact his primary physician if he continues to lose weight    Follow-up in 1 year    Subjective:   This is 81 year old male who comes in today for follow-up visit.  He reports no chest pain or heart palpitations.  He gets short of breath with physical activities.  He has not had weight gain, PND, orthopnea.  His weight has declined over 20 pounds in the last year.  He states that he eats normally.  He denies orthostatic lightheadedness.    Review of Systems:   Negative other than history of present illness    Objective:   /60 (BP Location: Right arm, Patient Position: Sitting, Cuff Size: Adult Small)   Pulse (!) 46   Resp 16   Ht 1.778 m (5' 10\")   Wt 60.3 kg (133 lb)   BMI 19.08 kg/m    Physical Exam:  GENERAL: no distress  NECK: No JVD  LUNGS: Clear to auscultation.  CARDIAC: regular rhythm, S1 & S2 normal.  No heaves, thrills, gallops or murmurs.  ABDOMEN: flat, negative hepatosplenomegaly, soft and non-tender.  EXTREMITIES: No evidence of cyanosis, clubbing or edema.    Current Outpatient Medications   Medication Sig Dispense Refill     brimonidine (ALPHAGAN) 0.2 % ophthalmic solution " [BRIMONIDINE (ALPHAGAN) 0.2 % OPHTHALMIC SOLUTION] Administer 1 drop to both eyes 2 (two) times a day.       dorzolamide (TRUSOPT) 2 % ophthalmic solution [DORZOLAMIDE (TRUSOPT) 2 % OPHTHALMIC SOLUTION] Administer 1 drop to both eyes daily.       FOLIC ACID/MULTIVIT-MIN/LUTEIN (CENTRUM SILVER ORAL) [FOLIC ACID/MULTIVIT-MIN/LUTEIN (CENTRUM SILVER ORAL)] Take 1 tablet by mouth daily.       furosemide (LASIX) 20 MG tablet TAKE 1 TABLET(20 MG) BY MOUTH DAILY 90 tablet 0     latanoprost (XALATAN) 0.005 % ophthalmic solution [LATANOPROST (XALATAN) 0.005 % OPHTHALMIC SOLUTION] Administer 1 drop to both eyes daily.  4     losartan (COZAAR) 50 MG tablet Take 1 tablet (50 mg) by mouth daily 90 tablet 0     metoprolol succinate (TOPROL-XL) 25 MG [METOPROLOL SUCCINATE (TOPROL-XL) 25 MG] Take 0.5 tablets (12.5 mg total) by mouth daily. 45 tablet 3     VYZULTA 0.024 % Drop [VYZULTA 0.024 % DROP] INSTILL 1 GTT INTO OU QD         Cardiographics:    AICD interrogation: No therapies, 37 % RV paced(backup pacing set up for40 bpm)     Cardiac MR: September 2015   1. Left ventricle is mildly enlarged with severe left ventricular systolic   dysfunction. There is no abnormal gadolinium enhancement. LVEF: 23%   2. Right ventricle is mildly enlarged with severe right   ventricular systolic dysfunction.   3. Moderate mitral regurgitation.   4. Normal ascending and descending aorta size.      Echo: October 2017  1. The left ventricle is normal in size. Left ventricular systolic performance is moderately reduced. The ejection fraction is estimated to be 35-40%.   2. There is moderate global reduction in left ventricular systolic performance.   3. There is mild concentric increase in left ventricular wall thickness.  4. There is mild aortic insufficiency.   5. There is moderate mitral insufficiency.   6. There is moderate tricuspid insufficiency.   7. Normal right ventricular size and systolic performance.   8. There is moderate to severe left  atrial enlargement. There is moderate right atrial enlargement.   9. Right ventricular systolic pressure relative to right atrial pressure is mildly increased.  The pulmonary artery pressure is estimated to be 40-45 mmHg plus right atrial pressure (the IVC is of fairly normal caliber).      When compared to the prior real-time echocardiogram dated 3 February 2016, there has been a mild decline in left ventricular systolic performance from previous.  There is been an increase in the degree of mitral and tricuspid insufficiency which both appear to be moderate on the current examination.     Lab Results    Chemistry/lipid CBC Cardiac Enzymes/BNP/TSH/INR   No results for input(s): CHOL, HDL, LDL, TRIG, CHOLHDLRATIO in the last 13788 hours.  No results for input(s): LDL in the last 48213 hours.  Recent Labs   Lab Test 07/02/21 0918      POTASSIUM 4.0   CHLORIDE 102   CO2 26   *   BUN 30*   CR 1.04   GFRESTIMATED >60   LAURITA 9.8     Recent Labs   Lab Test 07/02/21 0918 11/13/20  0933 10/10/18  1423   CR 1.04 0.86 0.82     No results for input(s): A1C in the last 03888 hours.       Recent Labs   Lab Test 11/13/20 0933   WBC 7.6   HGB 14.2   HCT 42.7           Recent Labs   Lab Test 11/13/20 0933 10/29/18  1512 05/18/18  1349   HGB 14.2 15.1 14.1    No results for input(s): TROPONINI in the last 07343 hours.  Recent Labs   Lab Test 11/13/20 0933 10/10/18  1423   * 193*     Recent Labs   Lab Test 07/02/21 0918   TSH 0.92     No results for input(s): INR in the last 42969 hours.                     Thank you for allowing me to participate in the care of your patient.      Sincerely,     Jv Pineda MD     [facility]  cc:   Julián Cornejo MD  [address] [place] [address]

## 2022-03-25 NOTE — TELEPHONE ENCOUNTER
Echo ordered per 1/17/22 office visit:     Plan:   Continue current cardiac medications including losartan metoprolol and furosemide.  I offered him reassessment of LV systolic function and filling pressure with echo.  He did not want to do it at this point.   I encouraged him to contact his primary physician if he continues to lose weight   Follow-up in 1 year    Pt short of breath and BNP over 1900.  Return message to scheduling.  nancy

## 2022-03-25 NOTE — TELEPHONE ENCOUNTER
[facility] Call Center    Phone Message    May a detailed message be left on voicemail: yes     Reason for Call: Order(s): Other:   Reason for requested: Echo  Date needed: anytime  Provider name: Dr Pineda  Per pt's daughter the pt wants to have the echo completed now since he has some other health concerns at this time   Please call daughter Pebbles to schedule appt   Please send to in-clinic to schedule       Action Taken: Other: cardiology    Travel Screening: Not Applicable

## 2022-03-29 NOTE — TELEPHONE ENCOUNTER
[facility]    Phone Message    May a detailed message be left on voicemail: yes     Reason for Call: Requesting Results   Name/type of test: CT that was ordered by Pt's PCP, Dr. Murray  Date of test: 3/29/2022  Was test done at a location other than [facility] (Please fill in the location if not [facility])?: Yes: [facility] in [place] - phone: [phone] - Pebbles reporting that the results showed an enlarged heart and fluid around lungs - possible pneumonia.  Please call Pebbles back to discuss      Action Taken: Message routed to:  Other: Cardiology    Travel Screening: Not Applicable

## 2022-03-30 NOTE — TELEPHONE ENCOUNTER
Phone call to patient's daughter Pebbles - informed her of Dr. Pineda's recommendations - Pebbles verbalized understanding, confirmed preferred pharmacy for new Rx and will plan for patient to have lab drawn at  outpt clinic same day as echo (4-6-22) - orders placed per protocol.  mg

## 2022-03-30 NOTE — TELEPHONE ENCOUNTER
Return call to patient's daughter Pebbles - informed her that 3-28-22 CT results would need to be sent to Dr. Pineda for review in order to provide comments/recommendations - Pebbles stated [facility] had faxed results late yesterday - provided Pebbles with nurse's fax # for results to be sent directly and confirmed 4-6-22 echo.    Explained to Pebbles that Dr. Pineda would review all test results once rec'd and instructed her to follow-up w/PCP for recommendations on CT during interim  - understanding verbalized.  mg

## 2022-03-30 NOTE — TELEPHONE ENCOUNTER
Please review 3-28-22 chest CT results scanned to patient's chart per daughter's request and note echo sched for 4-6-22 - any new orders at this time?  mg

## 2022-04-06 NOTE — PROGRESS NOTES
Jv Pineda MD   4/6/2022 12:18 PM CDT         Still appears to be fluid overloaded.  Should increase furosemide to 40 mg twice a day and see CHF nurse practitioner in 1 week         Orders placed. msg to  pool. -[facility]

## 2022-04-20 NOTE — PROGRESS NOTES
Assessment/Recommendations   Assessment:    1.  Dilated cardiomyopathy with acute on chronic systolic heart failure, NYHA class III: Decompensated, he still has crackles in bilateral bases and 1-2+ pitting edema in lower extremities.    Most recent echocardiogram showed LVEF severely reduced to 25% with moderate to severe mitral insufficiency.  BNP was found significantly elevated in 1000's about a month ago at primary's office.  His furosemide was increased to 40 mg twice a day on 4/6/2022.    Patient reports 50% improvement in his shortness of breath.  He denies shortness of breath at rest, PND or orthopnea although he states he sleeps poorly.  He does not weigh himself at home.  Clinic weight on 1/17/2022 was 133 pounds and today clinic weight is 148 pounds.  He does not weigh himself at home.  He is not following strict low-sodium diet although he is not adding extra salt to his food.    Heart failure regimen includes:    -Beta-blocker therapy with metoprolol succinate 12.5 mg daily    -ARB Losartan 50 mg twice a day    -Diuretic therapy with furosemide 40 mg twice a day    We discussed and reviewed about heart failure, medication management, and lifestyle management including low sodium diet <2 g/day, daily weight, and staying physically active as tolerated.  Patient has seen in the heart failure clinic approximately 4 years ago.  Provided heart failure education materials and information on heart failure   resources.    2.  Permanent atrial fibrillation: Rate controlled on Metroprolol succinate.  Not on anticoagulation therapy per patient's decision.    3.  Weight loss: Patient had weight loss of 20 pounds in the last 1 year.  He reports his appetite is fair.    Plan/Recommendation:  -BMP pending  -Instructed to take 40 mg Lasix 2 tablets as soon as he gets home.  -We will follow-up after lab results back with further recommendations on diuretic therapy  -Encouraged to stay on low-sodium less than 2  g/day, daily weight check, and maintain fluid intake at 48 to 50 ounces per day    Follow up with me in 1 to 2 weeks     History of Present Illness/Subjective    Mr. Richard Millan is a 82 year old male with a past medical history of chronic atrial fibrillation, hypertension, dilated cardiomyopathy with acute on chronic systolic heart failure with status post ICD who is seen at [facility] for heart failure follow-up per recommendation from Dr. Pineda.    Patient was seen by Dr. Pineda in January.  He was recommended to have repeat echocardiogram to reassess his LV systolic function and filling pressure.  His repeat echo showed severely reduced LVEF of 25% with moderate to severe mitral insufficiency, moderate tricuspid insufficiency, with moderately increased right atrial pressure.    Today, Олег reports about 50% improvement in his shortness of breath since taking higher dose of furosemide.   He denies fatigue, lightheadedness, shortness of breath, orthopnea, PND, palpitations, chest pain and abdominal fullness/bloating.     ECHO on 4/6/22-Reviewed:   Interpretation Summary   1. The left ventricle is mildly enlarged. Left ventricular systolic  performance is severely reduced. The ejection fraction is estimated to be 25%.  2. There is severe global reduction in left ventricular systolic performance.  3. There is mild aortic insufficiency.  4. There is moderate to severe mitral insufficiency.  5. There is moderate tricuspid insufficiency.  6. There is moderate right ventricular enlargement with moderately reduced  right ventricular systolic performance.  7. There is severe biatrial enlargement.  8. Right ventricular systolic pressure relative to right atrial pressure is  moderately increased. The pulmonary artery pressure is estimated to be 50 mmHg  plus right atrial pressure. In addition, the IVC appears dilated with  decreased phasic variation in caval diameter consistent  "with elevated right  atrial pressure.  9. There is a fairly small pericardial effusion. There is no evidence of  significant intraventricular dependence/tamponade physiology.     The prior real-time echocardiogram could not be accessed from the digital  archive for direct comparison.     Physical Examination Review of Systems   /70 (BP Location: Right arm, Patient Position: Sitting, Cuff Size: Adult Regular)   Pulse 64   Resp 16   Ht 1.778 m (5' 10\")   Wt 67.5 kg (148 lb 12.8 oz)   BMI 21.35 kg/m    Body mass index is 21.35 kg/m .  Wt Readings from Last 3 Encounters:   04/21/22 67.5 kg (148 lb 12.8 oz)   01/17/22 60.3 kg (133 lb)   11/13/20 72.1 kg (159 lb)     General Appearance:   no distress, normal body habitus   ENT/Mouth: membranes moist, no oral lesions or bleeding gums.      EYES:  no scleral icterus, normal conjunctivae   Neck: no carotid bruits or thyromegaly   Chest/Lungs:   lungs are clear to auscultation, no rales or wheezing, equal chest wall expansion except diminished lung sounds and crackles in bilateral bases   Cardiovascular:    Normal first and second heart sounds with no murmurs, rubs, or gallops; the carotid, radial and posterior tibial pulses are intact, Jugular venous pressure mildly elevated, 1-2+ pitting  edema bilaterally    Abdomen:  no organomegaly, masses, bruits, or tenderness; bowel sounds are present   Extremities   no cyanosis or clubbing   Radial pulses and Pedal pulses intact and symmetrical.  CMS intact.   Skin: no xanthelasma, warm.    Neurologic: normal  bilateral, no tremors     Psychiatric: alert and oriented x3, calm            Negative unless noted in HPI     Medical History  Surgical History Family History Social History   Past Medical History:   Diagnosis Date     Arrhythmia     atrial fibrillation     Hypertension     No past surgical history on file. Family History   Problem Relation Age of Onset     Sudden Death Brother 50.00        no autopsy    Social " History     Socioeconomic History     Marital status:      Spouse name: Not on file     Number of children: Not on file     Years of education: Not on file     Highest education level: Not on file   Occupational History     Not on file   Tobacco Use     Smoking status: Former Smoker     Quit date: 9/15/1970     Years since quittin.6     Smokeless tobacco: Never Used   Substance and Sexual Activity     Alcohol use: Not on file     Drug use: No     Sexual activity: Not on file   Other Topics Concern     Not on file   Social History Narrative     Not on file     Social Determinants of Health     Financial Resource Strain: Not on file   Food Insecurity: Not on file   Transportation Needs: Not on file   Physical Activity: Not on file   Stress: Not on file   Social Connections: Not on file   Intimate Partner Violence: Not on file   Housing Stability: Not on file          Medications  Allergies   Current Outpatient Medications   Medication Sig Dispense Refill     brimonidine (ALPHAGAN) 0.2 % ophthalmic solution [BRIMONIDINE (ALPHAGAN) 0.2 % OPHTHALMIC SOLUTION] Administer 1 drop to both eyes 2 (two) times a day.       dorzolamide (TRUSOPT) 2 % ophthalmic solution [DORZOLAMIDE (TRUSOPT) 2 % OPHTHALMIC SOLUTION] Administer 1 drop to both eyes daily.       FOLIC ACID/MULTIVIT-MIN/LUTEIN (CENTRUM SILVER ORAL) [FOLIC ACID/MULTIVIT-MIN/LUTEIN (CENTRUM SILVER ORAL)] Take 1 tablet by mouth daily.       furosemide (LASIX) 40 MG tablet Take 1 tablet (40 mg) by mouth 2 times daily 180 tablet 0     latanoprost (XALATAN) 0.005 % ophthalmic solution [LATANOPROST (XALATAN) 0.005 % OPHTHALMIC SOLUTION] Administer 1 drop to both eyes daily.  4     losartan (COZAAR) 50 MG tablet TAKE 1 TABLET(50 MG) BY MOUTH DAILY 90 tablet 1     metoprolol succinate (TOPROL-XL) 25 MG [METOPROLOL SUCCINATE (TOPROL-XL) 25 MG] Take 0.5 tablets (12.5 mg total) by mouth daily. 45 tablet 3     VYZULTA 0.024 % Drop Place 1 drop into both eyes daily       Allergies   Allergen Reactions     Other Environmental Allergy Other (See Comments)     Hayfever         Lab Results    Chemistry/lipid CBC Cardiac Enzymes/BNP/TSH/INR   Lab Results   Component Value Date    BUN 31 (H) 04/06/2022     (H) 04/06/2022    CO2 32 (H) 04/06/2022    Lab Results   Component Value Date    WBC 8.1 03/24/2022    HGB 13.2 (L) 03/24/2022    HCT 40.9 03/24/2022     (H) 03/24/2022     (L) 03/24/2022    Lab Results   Component Value Date    BNP 1,910 (H) 03/24/2022    TSH 0.92 07/02/2021        42  minutes spent on the date of encounter doing chart review, review of test results, interpretation with above tests, patient visit and documentation.      This note has been dictated using voice recognition software. Any grammatical, typographical, or context distortions are unintentional and inherent to the software

## 2022-04-21 NOTE — PATIENT INSTRUCTIONS
Richard Millan,    It was a pleasure to see you today at the [facility].     My recommendations after this visit include:    - Take Furosemide 40 mg 2 tablets (total 80 mg) when you get home.    - Keep your salt intake <2000 mg per day, daily weight monitoring (every morning - empty your bladder before wt check), and keep your fluid intake at 48-50 ounces per day    - We will follow up with you once your lab result is back    -  Follow up with Fish in 1-2 weeks    - Please call BEBE Hernadez on [phone]  if you have any questions or concerns    Fish Brown CNP     What Is Heart Failure?  The heart is a muscle. It pumps oxygen-rich blood to all parts of the body. When you have heart failure, the heart can t pump as well as it should. Blood and fluid may back up into the lungs, and some parts of the body don t get enough oxygen-rich blood to work normally. These problems lead to the symptoms you feel.    When You Have Heart Failure  Because of heart failure, not enough blood leaves the heart with each beat. There are two types of heart failure. Both affect the ventricles  ability to pump blood. You may have one or both types.     Systolic heart failure: The heart muscle becomes weak and enlarged. It can t pump enough blood forward when the ventricles contract. Ejection fraction is lower than normal.   Diastolic heart failure: The heart muscle becomes stiff. It doesn t relax normally between contractions, which keeps the ventricles from filling with blood. Ejection fraction is often in the normal range.     How Heart Failure Affects Your Body  When the heart doesn t pump enough blood, hormones (body chemicals) are sent to increase the amount of work the heart does. Some hormones make the heart grow larger. Others tell the heart to pump faster. As a result, the heart may pump more blood at first, but it can t keep up with the ongoing demands. So, the heart muscle becomes more damaged. Over  time, even less blood is pumped through the heart. This leads to problems throughout the body.    What Is Ejection Fraction?  Ejection fraction (EF) measures how much blood the heart pumps out (ejects). This is measured to help diagnose heart failure. A healthy heart pumps at least half of the blood from the ventricles with each beat. This means a normal ejection fraction is around 50% or more.       5595-3643 [employer]. [address] [place] [address], [place] [address]. All rights reserved. This information is not intended as a substitute for professional medical care. Always follow your healthcare professional's instructions.

## 2022-04-21 NOTE — TELEPHONE ENCOUNTER
----- Message from AJ Mejía CNP sent at 4/21/2022  2:02 PM CDT -----  Олег Gan's BMP is stable.  Please have him increase Furosemide from 40 mg twice a day to 80 mg in AM and continue with 40 mg in PM.  Stay on food high in potassium.  Call us if lightheaded, dizziness or excessive fatigue, dry mouth or weakness.  Follow-up with me in 1 to 2 weeks with repeat BMP level.  Looks like he has not made that appointment today as recommended.  Thank you!  CY        ==Called patient and updated on results and recommendations from CY. Medication list updated, orders placed. Result letter mailed at his request. Message to schedulers to please arrange follow up in 1-2 weeks with repeat BMP. [place] [place]

## 2022-04-21 NOTE — LETTER
4/21/2022    Ilia Murray MD  [facility] [address]  [facility] [id number]    RE: Richard FRIEND Yana       Dear Colleague,     I had the pleasure of seeing Richard Josephkyrie in the [facility].          Assessment/Recommendations   Assessment:    1.  Dilated cardiomyopathy with acute on chronic systolic heart failure, NYHA class III: Decompensated, he still has crackles in bilateral bases and 1-2+ pitting edema in lower extremities.    Most recent echocardiogram showed LVEF severely reduced to 25% with moderate to severe mitral insufficiency.  BNP was found significantly elevated in 1000's about a month ago at primary's office.  His furosemide was increased to 40 mg twice a day on 4/6/2022.    Patient reports 50% improvement in his shortness of breath.  He denies shortness of breath at rest, PND or orthopnea although he states he sleeps poorly.  He does not weigh himself at home.  Clinic weight on 1/17/2022 was 133 pounds and today clinic weight is 148 pounds.  He does not weigh himself at home.  He is not following strict low-sodium diet although he is not adding extra salt to his food.    Heart failure regimen includes:    -Beta-blocker therapy with metoprolol succinate 12.5 mg daily    -ARB Losartan 50 mg twice a day    -Diuretic therapy with furosemide 40 mg twice a day    We discussed and reviewed about heart failure, medication management, and lifestyle management including low sodium diet <2 g/day, daily weight, and staying physically active as tolerated.  Patient has seen in the heart failure clinic approximately 4 years ago.  Provided heart failure education materials and information on heart failure   resources.    2.  Permanent atrial fibrillation: Rate controlled on Metroprolol succinate.  Not on anticoagulation therapy per patient's decision.    3.  Weight loss: Patient had weight loss of 20 pounds in the last 1 year.  He reports his appetite is  fair.    Plan/Recommendation:  -BMP pending  -Instructed to take 40 mg Lasix 2 tablets as soon as he gets home.  -We will follow-up after lab results back with further recommendations on diuretic therapy  -Encouraged to stay on low-sodium less than 2 g/day, daily weight check, and maintain fluid intake at 48 to 50 ounces per day    Follow up with me in 1 to 2 weeks     History of Present Illness/Subjective    Mr. Richard Millan is a 82 year old male with a past medical history of chronic atrial fibrillation, hypertension, dilated cardiomyopathy with acute on chronic systolic heart failure with status post ICD who is seen at [facility] for heart failure follow-up per recommendation from Dr. Pineda.    Patient was seen by Dr. Pineda in January.  He was recommended to have repeat echocardiogram to reassess his LV systolic function and filling pressure.  His repeat echo showed severely reduced LVEF of 25% with moderate to severe mitral insufficiency, moderate tricuspid insufficiency, with moderately increased right atrial pressure.    Today, Олег reports about 50% improvement in his shortness of breath since taking higher dose of furosemide.   He denies fatigue, lightheadedness, shortness of breath, orthopnea, PND, palpitations, chest pain and abdominal fullness/bloating.     ECHO on 4/6/22-Reviewed:   Interpretation Summary   1. The left ventricle is mildly enlarged. Left ventricular systolic  performance is severely reduced. The ejection fraction is estimated to be 25%.  2. There is severe global reduction in left ventricular systolic performance.  3. There is mild aortic insufficiency.  4. There is moderate to severe mitral insufficiency.  5. There is moderate tricuspid insufficiency.  6. There is moderate right ventricular enlargement with moderately reduced  right ventricular systolic performance.  7. There is severe biatrial enlargement.  8. Right ventricular systolic  "pressure relative to right atrial pressure is  moderately increased. The pulmonary artery pressure is estimated to be 50 mmHg  plus right atrial pressure. In addition, the IVC appears dilated with  decreased phasic variation in caval diameter consistent with elevated right  atrial pressure.  9. There is a fairly small pericardial effusion. There is no evidence of  significant intraventricular dependence/tamponade physiology.     The prior real-time echocardiogram could not be accessed from the digital  archive for direct comparison.     Physical Examination Review of Systems   /70 (BP Location: Right arm, Patient Position: Sitting, Cuff Size: Adult Regular)   Pulse 64   Resp 16   Ht 1.778 m (5' 10\")   Wt 67.5 kg (148 lb 12.8 oz)   BMI 21.35 kg/m    Body mass index is 21.35 kg/m .  Wt Readings from Last 3 Encounters:   04/21/22 67.5 kg (148 lb 12.8 oz)   01/17/22 60.3 kg (133 lb)   11/13/20 72.1 kg (159 lb)     General Appearance:   no distress, normal body habitus   ENT/Mouth: membranes moist, no oral lesions or bleeding gums.      EYES:  no scleral icterus, normal conjunctivae   Neck: no carotid bruits or thyromegaly   Chest/Lungs:   lungs are clear to auscultation, no rales or wheezing, equal chest wall expansion except diminished lung sounds and crackles in bilateral bases   Cardiovascular:    Normal first and second heart sounds with no murmurs, rubs, or gallops; the carotid, radial and posterior tibial pulses are intact, Jugular venous pressure mildly elevated, 1-2+ pitting  edema bilaterally    Abdomen:  no organomegaly, masses, bruits, or tenderness; bowel sounds are present   Extremities   no cyanosis or clubbing   Radial pulses and Pedal pulses intact and symmetrical.  CMS intact.   Skin: no xanthelasma, warm.    Neurologic: normal  bilateral, no tremors     Psychiatric: alert and oriented x3, calm            Negative unless noted in HPI     Medical History  Surgical History Family History " Social History   Past Medical History:   Diagnosis Date     Arrhythmia     atrial fibrillation     Hypertension     No past surgical history on file. Family History   Problem Relation Age of Onset     Sudden Death Brother 50.00        no autopsy    Social History     Socioeconomic History     Marital status:      Spouse name: Not on file     Number of children: Not on file     Years of education: Not on file     Highest education level: Not on file   Occupational History     Not on file   Tobacco Use     Smoking status: Former Smoker     Quit date: 9/15/1970     Years since quittin.6     Smokeless tobacco: Never Used   Substance and Sexual Activity     Alcohol use: Not on file     Drug use: No     Sexual activity: Not on file   Other Topics Concern     Not on file   Social History Narrative     Not on file     Social Determinants of Health     Financial Resource Strain: Not on file   Food Insecurity: Not on file   Transportation Needs: Not on file   Physical Activity: Not on file   Stress: Not on file   Social Connections: Not on file   Intimate Partner Violence: Not on file   Housing Stability: Not on file          Medications  Allergies   Current Outpatient Medications   Medication Sig Dispense Refill     brimonidine (ALPHAGAN) 0.2 % ophthalmic solution [BRIMONIDINE (ALPHAGAN) 0.2 % OPHTHALMIC SOLUTION] Administer 1 drop to both eyes 2 (two) times a day.       dorzolamide (TRUSOPT) 2 % ophthalmic solution [DORZOLAMIDE (TRUSOPT) 2 % OPHTHALMIC SOLUTION] Administer 1 drop to both eyes daily.       FOLIC ACID/MULTIVIT-MIN/LUTEIN (CENTRUM SILVER ORAL) [FOLIC ACID/MULTIVIT-MIN/LUTEIN (CENTRUM SILVER ORAL)] Take 1 tablet by mouth daily.       furosemide (LASIX) 40 MG tablet Take 1 tablet (40 mg) by mouth 2 times daily 180 tablet 0     latanoprost (XALATAN) 0.005 % ophthalmic solution [LATANOPROST (XALATAN) 0.005 % OPHTHALMIC SOLUTION] Administer 1 drop to both eyes daily.  4     losartan (COZAAR) 50 MG  tablet TAKE 1 TABLET(50 MG) BY MOUTH DAILY 90 tablet 1     metoprolol succinate (TOPROL-XL) 25 MG [METOPROLOL SUCCINATE (TOPROL-XL) 25 MG] Take 0.5 tablets (12.5 mg total) by mouth daily. 45 tablet 3     VYZULTA 0.024 % Drop Place 1 drop into both eyes daily      Allergies   Allergen Reactions     Other Environmental Allergy Other (See Comments)     Hayfever         Lab Results    Chemistry/lipid CBC Cardiac Enzymes/BNP/TSH/INR   Lab Results   Component Value Date    BUN 31 (H) 04/06/2022     (H) 04/06/2022    CO2 32 (H) 04/06/2022    Lab Results   Component Value Date    WBC 8.1 03/24/2022    HGB 13.2 (L) 03/24/2022    HCT 40.9 03/24/2022     (H) 03/24/2022     (L) 03/24/2022    Lab Results   Component Value Date    BNP 1,910 (H) 03/24/2022    TSH 0.92 07/02/2021        42  minutes spent on the date of encounter doing chart review, review of test results, interpretation with above tests, patient visit and documentation.      This note has been dictated using voice recognition software. Any grammatical, typographical, or context distortions are unintentional and inherent to the software        Thank you for allowing me to participate in the care of your patient.      Sincerely,   AJ Mejía [facility]      cc:   Jv Pineda MD  [address] [facility]  [address]  [place] [address]

## 2022-04-21 NOTE — LETTER
April 21, 2022      Richard Millan  [address]  [place] [address]        Dear ,    We are writing to inform you of your test results.    Fish Brown APRN CNP Caswell, Mallory J, RN Hi MalОлег's BMP is stable.   Please have him increase Furosemide from 40 mg twice a day to 80 mg in AM and continue with 40 mg in PM.   Stay on food high in potassium.   Call us if lightheaded, dizziness or excessive fatigue, dry mouth or weakness.   Follow-up with me in 1 to 2 weeks with repeat BMP level.  Looks like he has not made that appointment today as recommended.   Thank you!   CY       Resulted Orders   Basic metabolic panel   Result Value Ref Range    Sodium 143 136 - 145 mmol/L    Potassium 4.3 3.5 - 5.0 mmol/L    Chloride 103 98 - 107 mmol/L    Carbon Dioxide (CO2) 33 (H) 22 - 31 mmol/L    Anion Gap 7 5 - 18 mmol/L    Urea Nitrogen 26 8 - 28 mg/dL    Creatinine 0.84 0.70 - 1.30 mg/dL    Calcium 9.7 8.5 - 10.5 mg/dL    Glucose 97 70 - 125 mg/dL    GFR Estimate 88 >60 mL/min/1.73m2      Comment:      Effective December 21, 2021 eGFRcr in adults is calculated using the 2021 CKD-EPI creatinine equation which includes age and gender (Carisa et al., NEJM, DOI: 10.1056/MLMHth2948606)       If you have any questions or concerns, please call the clinic at the number listed above.       Sincerely,      AJ Mejía CNP

## 2022-04-26 NOTE — ED NOTES
Pt a/ox4, transferred to bed from wheelchair upon rooming. Denies cp, sob, lightheadedness/dizziness. States he does not have any pain and that he feels relief after being catheterized. Catheter present, dark amanda urine outputting, 600 mL drained.

## 2022-04-26 NOTE — TELEPHONE ENCOUNTER
----- Message from AJ Mejía CNP sent at 4/21/2022  2:02 PM CDT -----  Hi Олег Hernadez's BMP is stable.  Please have him increase Furosemide from 40 mg twice a day to 80 mg in AM and continue with 40 mg in PM.  Stay on food high in potassium.  Call us if lightheaded, dizziness or excessive fatigue, dry mouth or weakness.  Follow-up with me in 1 to 2 weeks with repeat BMP level.  Looks like he has not made that appointment today as recommended.  Thank you!  CY

## 2022-04-26 NOTE — DISCHARGE INSTRUCTIONS
Call your doctor tomorrow as discussed.  You need to schedule removal of your [place] catheter for Friday or the following Monday.  Try to get your catheter removed early in the day so that if there are any issues and may be taking care of during the daylight hours rather in the middle of the night.  Stay on the antibiotics until you complete all of them.  Return for any signs of fever or other signs of illness.

## 2022-04-26 NOTE — ED PROVIDER NOTES
EMERGENCY DEPARTMENT ENCOUNTER      NAME: Richard Millan  AGE: 82 year old male  YOB: 1940  MRN: [id number]  EVALUATION DATE & TIME: No admission date for patient encounter.    PCP: Ilia Murray    ED PROVIDER: Barron Burch M.D.      Chief Complaint   Patient presents with     Urinary Retention         FINAL IMPRESSION:  1. Acute urinary retention    2. Acute cystitis without hematuria          ED COURSE & MEDICAL DECISION MAKING:    Pertinent Labs & Imaging studies reviewed. (See chart for details)  82 year old male presents to the Emergency Department for evaluation of inability urinate.  Patient reports on Sunday he suddenly could not urinate normally.  He is able to dribble only.  Started having lower abdominal pain yesterday with worsening today.  Seen initially in triage and noted to have acute urinary retention.  Hermosillo catheter placed with approximately 600 cc of dark amanda urine produced.  Patient reports resolution of discomfort.  Denies prior similar episodes.  No recent change in medications.  No antihistamines.  Labs were sent.  Patient will be reevaluated in the emergency room.. Patient appears non toxic with stable vitals signs. Overall exam is benign.        4:02 PM I met with the patient for the initial interview and physical examination. Discussed plan for treatment and workup in the ED.    5:37 PM I re-evaluated and updated patient.  Patient asymptomatic.  Hermosillo catheter draining dark amanda urine.  Laboratory evaluation with leukocytosis and urinary tract infection.  However patient without signs of illness.  Renal function normal.  Patient received intravenous Rocephin will plan for continued outpatient antibiotics with Bactrim.  Lengthy discussion regarding follow-up primary care physician for Hermosillo cath removal on Friday or Monday.  We discussed plans for discharge and patient is agreeable.    At the conclusion of the encounter I discussed the results of all of the tests and the  disposition. The questions were answered and return precautions provided. The patient or family acknowledged understanding and was agreeable with the care plan.       PPE: Provider wore gloves, N95 mask, eye protection, surgical cap    MEDICATIONS GIVEN IN THE EMERGENCY:  Medications   lidocaine (XYLOCAINE) 2 % external gel 10 mL (has no administration in time range)   cefTRIAXone (ROCEPHIN) 1 g vial to attach to  mL bag for ADULTS or NS 50 mL bag for PEDS (has no administration in time range)       NEW PRESCRIPTIONS STARTED AT TODAY'S ER VISIT  Current Discharge Medication List      START taking these medications    Details   sulfamethoxazole-trimethoprim (BACTRIM DS) 800-160 MG tablet Take 1 tablet by mouth 2 times daily for 10 days  Qty: 20 tablet, Refills: 0                =================================================================    HPI    Patient information was obtained from: Patient    Use of : N/A        Richard Millan is a 82 year old male with a pertinent medical history of dilated cardiomyopathy, ICD, atrial fibrillation, HTN, who presents to the ED for evaluation of urinary retention.    Patient reports an onset of urinary retention since 1300 on Sunday (2 days ago). Initially patient only was able to void in dribbles and notes gradually increasing suprapubic abdominal pain since yesterday. No prior similar episodes. He denies taking any cold medications and otherwise is feeling well. No significant prostate history or medications. Patient reports some ongoing constipation but denies additional medical concerns or complaints at this time.      REVIEW OF SYSTEMS   Constitutional:  Denies fever, chills  Respiratory:  Denies productive cough or increased work of breathing  Cardiovascular:  Denies chest pain, palpitations  GI: Positive for suprapubic abdominal pain. Denies nausea, vomiting, or change in bowel habits   : Positive for difficulty urinating/urinary  retention.  Musculoskeletal:  Denies any new muscle/joint swelling  Skin:  Denies rash   Neurologic:  Denies focal weakness  All systems negative except as marked.     PAST MEDICAL HISTORY:  Past Medical History:   Diagnosis Date     Arrhythmia     atrial fibrillation     Hypertension        PAST SURGICAL HISTORY:  History reviewed. No pertinent surgical history.      CURRENT MEDICATIONS:      Current Facility-Administered Medications:      cefTRIAXone (ROCEPHIN) 1 g vial to attach to  mL bag for ADULTS or NS 50 mL bag for PEDS, 1 g, Intravenous, Once, Barron Burch MD     lidocaine (XYLOCAINE) 2 % external gel 10 mL, 10 mL, Urethral, Once, Pat Zambrano MD    Current Outpatient Medications:      brimonidine (ALPHAGAN) 0.2 % ophthalmic solution, [BRIMONIDINE (ALPHAGAN) 0.2 % OPHTHALMIC SOLUTION] Administer 1 drop to both eyes 2 (two) times a day., Disp: , Rfl:      dorzolamide (TRUSOPT) 2 % ophthalmic solution, [DORZOLAMIDE (TRUSOPT) 2 % OPHTHALMIC SOLUTION] Administer 1 drop to both eyes daily., Disp: , Rfl:      FOLIC ACID/MULTIVIT-MIN/LUTEIN (CENTRUM SILVER ORAL), [FOLIC ACID/MULTIVIT-MIN/LUTEIN (CENTRUM SILVER ORAL)] Take 1 tablet by mouth daily., Disp: , Rfl:      furosemide (LASIX) 40 MG tablet, Take 80 mg in the AM and 40 mg in the PM, Disp: 180 tablet, Rfl: 0     latanoprost (XALATAN) 0.005 % ophthalmic solution, [LATANOPROST (XALATAN) 0.005 % OPHTHALMIC SOLUTION] Administer 1 drop to both eyes daily., Disp: , Rfl: 4     losartan (COZAAR) 50 MG tablet, TAKE 1 TABLET(50 MG) BY MOUTH DAILY, Disp: 90 tablet, Rfl: 1     metoprolol succinate (TOPROL-XL) 25 MG, [METOPROLOL SUCCINATE (TOPROL-XL) 25 MG] Take 0.5 tablets (12.5 mg total) by mouth daily., Disp: 45 tablet, Rfl: 3     VYZULTA 0.024 % Drop, Place 1 drop into both eyes daily, Disp: , Rfl:     ALLERGIES:  Allergies   Allergen Reactions     Other Environmental Allergy Other (See Comments)     Hayfever       FAMILY HISTORY:  Family History  "  Problem Relation Age of Onset     Sudden Death Brother 50.00        no autopsy       SOCIAL HISTORY:   Social History     Socioeconomic History     Marital status:    Tobacco Use     Smoking status: Former Smoker     Quit date: 9/15/1970     Years since quittin.6     Smokeless tobacco: Never Used   Substance and Sexual Activity     Drug use: No       VITALS:  Patient Vitals for the past 24 hrs:   BP Temp Temp src Pulse Resp SpO2 Height Weight   22 1730 133/81 -- -- 85 -- 100 % -- --   22 1659 139/61 98.1  F (36.7  C) Oral 96 18 100 % -- --   22 1443 110/79 99.4  F (37.4  C) Temporal 60 18 97 % 1.778 m (5' 10\") 66.7 kg (147 lb)        PHYSICAL EXAM    Constitutional:  Awake, alert, in no apparent distress  HENT:  Normocephalic, Atraumatic. Bilateral external ears normal. Oropharynx moist. Nose normal. Neck- Normal range of motion with no guarding, No midline cervical tenderness, Supple, No stridor.   Eyes:  PERRL, EOMI with no signs of entrapment, Conjunctiva normal, No discharge.   Respiratory:  Normal breath sounds, No respiratory distress, No wheezing.    Cardiovascular:  Normal heart rate, Normal rhythm, No appreciable rubs or gallops.   GI:  Soft, No tenderness, No distension, No palpable masses  : Catheter in place  Musculoskeletal:  No edema. Good range of motion in all major joints. No tenderness to palpation or major deformities noted.  Integument:  Warm, Dry, No erythema, No rash.   Neurologic:  Alert & oriented, Normal motor function, Normal sensory function, No focal deficits noted.   Psychiatric:  Affect normal, Judgment normal, Mood normal.     LAB:  All pertinent labs reviewed and interpreted.  Results for orders placed or performed during the hospital encounter of 22   CBC (+ platelets, no diff)   Result Value Ref Range    WBC Count 16.7 (H) 4.0 - 11.0 10e3/uL    RBC Count 4.18 (L) 4.40 - 5.90 10e6/uL    Hemoglobin 14.2 13.3 - 17.7 g/dL    Hematocrit 42.6 40.0 - " 53.0 %     (H) 78 - 100 fL    MCH 34.0 (H) 26.5 - 33.0 pg    MCHC 33.3 31.5 - 36.5 g/dL    RDW 13.0 10.0 - 15.0 %    Platelet Count 157 150 - 450 10e3/uL   Basic metabolic panel   Result Value Ref Range    Sodium 135 (L) 136 - 145 mmol/L    Potassium 3.5 3.5 - 5.0 mmol/L    Chloride 94 (L) 98 - 107 mmol/L    Carbon Dioxide (CO2) 32 (H) 22 - 31 mmol/L    Anion Gap 9 5 - 18 mmol/L    Urea Nitrogen 28 8 - 28 mg/dL    Creatinine 0.96 0.70 - 1.30 mg/dL    Calcium 9.9 8.5 - 10.5 mg/dL    Glucose 103 70 - 125 mg/dL    GFR Estimate 79 >60 mL/min/1.73m2   UA with Microscopic reflex to Culture    Specimen: Urine, Clean Catch   Result Value Ref Range    Color Urine Yellow Colorless, Straw, Light Yellow, Yellow    Appearance Urine Turbid (A) Clear    Glucose Urine Negative Negative mg/dL    Bilirubin Urine Negative Negative    Ketones Urine Negative Negative mg/dL    Specific Gravity Urine 1.016 1.001 - 1.030    Blood Urine 0.5 mg/dL (A) Negative    pH Urine 6.0 5.0 - 7.0    Protein Albumin Urine 20  (A) Negative mg/dL    Urobilinogen Urine 2.0 (A) <2.0 mg/dL    Nitrite Urine Negative Negative    Leukocyte Esterase Urine 75 Quinn/uL (A) Negative    Bacteria Urine Few (A) None Seen /HPF    Mucus Urine Present (A) None Seen /LPF    RBC Urine 126 (H) <=2 /HPF    WBC Urine 34 (H) <=5 /HPF    Hyaline Casts Urine 6 (H) <=2 /LPF       RADIOLOGY:  Reviewed all pertinent imaging. Please see official radiology report.  No orders to display         I, Molly Mahajan, am serving as a scribe to document services personally performed by Barron Burch MD, based on my observation and the provider's statements to me. I, Barron Burch MD attest that Molly Mahajan is acting in a scribe capacity, has observed my performance of the services and has documented them in accordance with my direction.    Barron Burch M.D.  Emergency Medicine  [facility] EMERGENCY DEPARTMENT     Barron Burch,  MD  04/26/22 4225

## 2022-04-26 NOTE — ED PROVIDER NOTES
"ED Provider In Triage Note  [facility]  Encounter Date: Apr 26, 2022    Chief Complaint   Patient presents with     Urinary Retention       Brief HPI:   Richard Millan is a 82 year old male, history of dilated cardiomyopathy, CHF, ICD placement, atrial fibrillation and HTN, presenting to the Emergency Department with a chief complaint of urinary retention since Sunday around noon (only tiny dribbles). Has associated lower abdominal discomfort with intermittent, mild pain across the lower abdomen. Mild pain across lower back. No N/V, fevers.     Brief Physical Exam:  /79   Pulse 60   Temp 99.4  F (37.4  C) (Temporal)   Resp 18   Ht 1.778 m (5' 10\")   Wt 66.7 kg (147 lb)   SpO2 97%   BMI 21.09 kg/m    General: Non-toxic appearing  HEENT: Atraumatic  Resp: No respiratory distress; clear lungs  Cardiac: RRR  Abdomen: soft, lower abdomen distended and mildly firm  Neuro: Alert, oriented, answers questions appropriately; cranial nerves grossly intact, no focal motor deficits  Psych: Behavior appropriate      Plan Initiated in Triage:  Bladder scan - love, as indicated  Basic labs (creatinine)  UA / UCx    PIT Dispo:   Room when able    Pat Zambrano MD on 4/26/2022 at 2:34 PM    Patient was evaluated by the Physician in Triage due to a limitation of available rooms in the Emergency Department. A plan of care was discussed based on the information obtained on the initial evaluation and patient was consuled to return back to the Emergency Department lobby after this initial evalutaiton until results were obtained or a room became available in the Emergency Department. Patient was counseled not to leave prior to receiving the results of their workup.        Pat Zambrano MD  04/26/22 1447    "

## 2022-04-26 NOTE — ED TRIAGE NOTES
Unable to empty bladder since Sunday, only dribbles with intermittent abdominal and low back pain. No sob, no cp

## 2022-05-03 NOTE — ED TRIAGE NOTES
Triage Assessment     Row Name 05/03/22 0852       Triage Assessment (Adult)    Airway WDL WDL       Respiratory WDL    Respiratory WDL WDL       Cardiac WDL    Cardiac WDL X            Pt states was here last week and had a love placed.  Love was discontinued yesterday and was told to return to if unable to void.  Pt states has not been able to void since yesterday.  Pt denies feeling lightheaded and or dizzy, states on a betablocker.

## 2022-05-03 NOTE — DISCHARGE INSTRUCTIONS
Were seen here in the ED for evaluation of urinary retention.  Your bladder had significant amount of urine in it, that was being retained.  We did place a Hermosillo catheter again, and drained out a significant amount of the urine.  We will discharge home with a Hermosillo catheter in place.  I did discuss your case with urology, they recommend you keep the catheter in place for the next 2 weeks.  Additionally we will trial you on 2 weeks of Flomax, which can help with prostate enlargement, and can be a good treatment for possible urinary retention.  Continue taking the antibiotics for the next couple of days, we will culture your urine here again.  Your urine testing results were consistent with the previous test, and although your urine did not grow anything on the initial culture, I think it is reasonable to continue antibiotics at this time given your symptoms and history of urinary retention.    Additionally your CT scan does not show any obvious clear reason for why you may be having urinary retention.  Your CT scan does show a large left-sided inguinal hernia, I would recommend that you follow-up with your primary care doctor in the next week or 2 regarding these results.  Additionally, please call the urology clinic listed in your discharge paperwork to schedule appointment with them.  They want to see you in approximately 2 weeks.    Please return to the ED if you develop worsening shortness of breath, continue to have urinary retention, your catheter becomes blocked, you develop a fever, worsening abdominal pain, nausea or vomiting, chest pain or shortness of breath.

## 2022-05-03 NOTE — ED PROVIDER NOTES
EMERGENCY DEPARTMENT ENCOUNTER      NAME: Richard Millan  AGE: 82 year old male  YOB: 1940  MRN: [id number]  EVALUATION DATE & TIME: 5/3/2022  8:56 AM    PCP: Ilia Murray    ED PROVIDER: Ata Mcgowan PA-C      Chief Complaint   Patient presents with     Urinary Retention         FINAL IMPRESSION:  1. Urinary retention    2. Acute cystitis without hematuria    3. Unilateral inguinal hernia without obstruction or gangrene, recurrence not specified          ED COURSE    9:01 AM I met with the patient, obtained history, performed an initial exam, and discussed options and plan for diagnostics and treatment here in the ED.   9:12 AM Patient's RN reports that the patient's bladder scan shows over 1000 cc of urine. A love catheter will be put in place.   9:15 AM I staffed the patient with my colleague, Dr. Urrutia, who will evaluate the patient. Dr. Urrutia was updated on the patient throughout ED course.    9:41 AM Spoke with SHANNAN Bowman, [facility]. She recommends getting an abdomen/pelvis CT, prescribing the patient flomax, and follow up with Urology in two weeks.   12:05 PM I rechecked and updated the patient. I discussed the plan for discharge with the patient, and patient is agreeable. We discussed supportive cares at home and reasons for return to the ER including new or worsening symptoms - all questions and concerns addressed. Patient to be discharged by RN.     At the conclusion of the encounter I discussed the results of all of the tests and the disposition. The questions were answered. The patient or family acknowledged understanding and was agreeable with the care plan.     PPE: Provider wore gloves, N95 mask, and eye protection.     MEDICAL DECISION MAKING:    Pertinent Labs & Imaging studies reviewed. (See chart for details)  82 year old male presents to the Emergency Department for evaluation of Urinary Retention     ED Course as of 05/03/22 1206   Tue May 03, 2022   0902  82-year-old male with significant past medical history for urinary retention, ICD implantation, CHF on Lasix presents to the ED for evaluation of urinary retention.  Patient was seen on the 26 by Dr. Ortega here in the ED, had a catheter placed and was discharged home on antibiotics.  Patient had the catheter removed on Monday morning by primary care.  Notes that he has not been able to urinate since then.  Notes some mild suprapubic abdominal pain, no other tenderness.  Primary concern is that he has not been able to urinate.  He has not followed up with urology at this time.  Differential includes urinary retention, possible kidney failure, urinary obstruction.  We will repeat basic laboratory studies including a UA, BMP, CBC.  Pending urology recommendations at this time.   0913 Scan indicates greater than 1000 cc of retained urine.  Plan to place a Hermosillo at this time.  Call placed to urology regarding recommendations.   0947 Spoke with Dr. Lovelace, Urology recommends follow up with CT scan to evaluate for cause of obstruction, evaluate for prostatitis. Should the remainder of his workup be normal, plan for discharge on flomax, catheter in place for two weeks and urology will follow up with him as an outpatient.    0953 Leukocyte esterase positive urine, few bacteria, WBC count in urine is 12. Previous urine cultures on 04/26/2022 did not grow any organisms. Urinalysis is fairly consistent with previous.    1021 CBC does not show an elevation of white blood cell count, hemoglobin is decreased, however is not concerning at this point.  BMP is normal, creatinine is not elevated, no evidence of acute kidney injury at this point.   1151 Hermosillo in place, significant amount of drainage.  Remainder of laboratory studies are unremarkable.  Leukocytosis that was seen on the 26 is since resolved.  UA again here shows some leukocytosis, few bacteria, consistent with his previous urinary testing, however previous cultures did  not grow any organisms at this time.  Patient does still have some doses of Bactrim at home which she has been taking, and will take for the next couple of days.  At this point I would recommend that he continues this given his urine, pending another culture at this time.  Patient is bradycardic here in the ED, however is completely asymptomatic from a cardiac standpoint.  He denies any chest pain, shortness of breath, dizziness, weakness, numbness or tingling.  Additionally he is mildly hyponatremic, however I do not think this is a contributing factor to any of his symptoms.   1157 CT Abdomen Pelvis w Contrast  IMPRESSION:   1.  Cardiomegaly with bilateral pleural effusions suggesting possible congestive heart failure.  2.  Large left inguinal hernia containing loops of colon with bowel extending into the scrotum no evidence for strangulation or bowel obstruction. No appendicitis, diverticulitis or abscess.  3.  No radiopaque gallstone  4.  calcification or metallic density along the lateral hepatic capsule consistent with postsurgical change.  5.  Enlarged prostate  6.  Hermosillo catheter in the bladder.    Discussed imaging results, and plan to discharge home with patient.  Conditions from her urology were to keep the catheter in place, and follow-up in approximately 2 weeks.  Additionally we will trial him on some Flomax at home.  Patient is asymptomatic, though does have a rather bradycardic pulse.  He notes this is somewhat baseline for him.  Alysis is consistent with some urinary tract infection, however his previous cultures did not grow anything here.  We will plan to culture his urine again today.  I recommend that he continues to take his antibiotics at home for the next couple of days, which she expresses agreement to.  Patient does not have any other complaints at this time.  Plan will be to keep the catheter in place, and follow-up with urology in approximately 2 weeks.  Patient expressed understanding of  this plan.  Unclear what is causing the urinary retention, however likely might be due to his enlarged prostate which was seen on the CT scan.  Patient has a known left large inguinal hernia which she has been worked up for, however has not having any further symptoms of bowel obstruction, or incarceration at this time.  I do not think this warrants any further intervention given that this is been present for quite some time, patient is aware of it.  Recommend that he follows up with his primary care physician with the results of the CT scan. No further workup here in the ED plan for discharge.        0 minutes of critical care time     MEDICATIONS GIVEN IN THE EMERGENCY:  Medications   iopamidol (ISOVUE-370) solution 100 mL (100 mLs Intravenous Given 5/3/22 1123)       NEW PRESCRIPTIONS STARTED AT TODAY'S ER VISIT  New Prescriptions    TAMSULOSIN (FLOMAX) 0.4 MG CAPSULE    Take 1 capsule (0.4 mg) by mouth daily for 14 doses            =================================================================    HPI    Patient information was obtained from: the patient     Use of Interpretor: N/A         Richard Millan is a 82 year old male with a pertinent history of hypertension, ICD, and chronic systolic heart failure who presents to this ED via walk in for evaluation of urinary retention.     Per Chart Review:   4/26/2022 the patient presented to [facility] ED for evaluation of urinary retention. A love catheter was placed and approximately 600 ml of dark amanda urine was produced. His lab work showed a urinary tract infection. The patient was discharged home on Bactrim.      The patient reports that he had his catheter removed at his primary care clinic yesterday (5/2), but has not been able to urinate since. He called his primary care provider today and was referred back to the ED to get another catheter put in place. He endorses anxiety, lower abdominal pain, and feet swelling. He notes that he has cut out a little  bit of water from his diet due to not being able to urinate, but he denies a change in appetite. He has not been seen by Urology, and has not scheduled an appointment to see them. The patient denies new chest pain or shortness of breath, dizziness, lightheadedness, and any other symptoms or complaints at this time.     REVIEW OF SYSTEMS   Review of Systems   Constitutional: Negative for appetite change.   Respiratory: Negative for shortness of breath.    Cardiovascular: Negative for chest pain.   Gastrointestinal: Positive for abdominal pain (lower).   Genitourinary: Positive for difficulty urinating.        Positive for urine retention.    Neurological: Negative for dizziness and light-headedness.   All other systems reviewed and are negative.         PAST MEDICAL HISTORY:  Past Medical History:   Diagnosis Date     Arrhythmia     atrial fibrillation     Hypertension        PAST SURGICAL HISTORY:  History reviewed. No pertinent surgical history.      CURRENT MEDICATIONS:    No current facility-administered medications for this encounter.    Current Outpatient Medications:      tamsulosin (FLOMAX) 0.4 MG capsule, Take 1 capsule (0.4 mg) by mouth daily for 14 doses, Disp: 14 capsule, Rfl: 0     brimonidine (ALPHAGAN) 0.2 % ophthalmic solution, [BRIMONIDINE (ALPHAGAN) 0.2 % OPHTHALMIC SOLUTION] Administer 1 drop to both eyes 2 (two) times a day., Disp: , Rfl:      dorzolamide (TRUSOPT) 2 % ophthalmic solution, [DORZOLAMIDE (TRUSOPT) 2 % OPHTHALMIC SOLUTION] Administer 1 drop to both eyes daily., Disp: , Rfl:      FOLIC ACID/MULTIVIT-MIN/LUTEIN (CENTRUM SILVER ORAL), [FOLIC ACID/MULTIVIT-MIN/LUTEIN (CENTRUM SILVER ORAL)] Take 1 tablet by mouth daily., Disp: , Rfl:      furosemide (LASIX) 40 MG tablet, Take 80 mg in the AM and 40 mg in the PM, Disp: 180 tablet, Rfl: 0     latanoprost (XALATAN) 0.005 % ophthalmic solution, [LATANOPROST (XALATAN) 0.005 % OPHTHALMIC SOLUTION] Administer 1 drop to both eyes daily., Disp: ,  "Rfl: 4     losartan (COZAAR) 50 MG tablet, TAKE 1 TABLET(50 MG) BY MOUTH DAILY, Disp: 90 tablet, Rfl: 1     metoprolol succinate (TOPROL-XL) 25 MG, [METOPROLOL SUCCINATE (TOPROL-XL) 25 MG] Take 0.5 tablets (12.5 mg total) by mouth daily., Disp: 45 tablet, Rfl: 3     sulfamethoxazole-trimethoprim (BACTRIM DS) 800-160 MG tablet, Take 1 tablet by mouth 2 times daily for 10 days, Disp: 20 tablet, Rfl: 0     VYZULTA 0.024 % Drop, Place 1 drop into both eyes daily, Disp: , Rfl:       ALLERGIES:  Allergies   Allergen Reactions     Other Environmental Allergy Other (See Comments)     Hayfever       FAMILY HISTORY:  Family History   Problem Relation Age of Onset     Sudden Death Brother 50.00        no autopsy       SOCIAL HISTORY:   Social History     Socioeconomic History     Marital status:    Tobacco Use     Smoking status: Former Smoker     Quit date: 9/15/1970     Years since quittin.6     Smokeless tobacco: Never Used   Substance and Sexual Activity     Drug use: No       VITALS:  Patient Vitals for the past 24 hrs:   BP Temp Pulse Resp SpO2 Height Weight   22 1030 119/61 -- (!) 43 -- 97 % -- --   22 1000 -- -- -- -- 95 % -- --   22 0849 130/68 97.9  F (36.6  C) (!) 39 16 -- 1.778 m (5' 10\") 66.7 kg (147 lb)       PHYSICAL EXAM    Physical Exam  Vitals reviewed.   Constitutional:       General: He is not in acute distress.     Appearance: Normal appearance. He is not toxic-appearing or diaphoretic.   HENT:      Head: Normocephalic.   Cardiovascular:      Rate and Rhythm: Regular rhythm. Bradycardia present.   Pulmonary:      Effort: Pulmonary effort is normal. No respiratory distress.      Breath sounds: Normal breath sounds. No wheezing or rales.   Abdominal:      General: Abdomen is flat. Bowel sounds are normal.      Tenderness: There is no right CVA tenderness, left CVA tenderness, guarding or rebound.   Musculoskeletal:         General: No swelling or tenderness. Normal range of " motion.      Right lower leg: No edema.      Left lower leg: No edema.   Neurological:      General: No focal deficit present.      Mental Status: He is alert and oriented to person, place, and time.        LAB:  All pertinent labs reviewed and interpreted.  Results for orders placed or performed during the hospital encounter of 05/03/22   CT Abdomen Pelvis w Contrast    Impression    IMPRESSION:   1.  Cardiomegaly with bilateral pleural effusions suggesting possible congestive heart failure.  2.  Large left inguinal hernia containing loops of colon with bowel extending into the scrotum no evidence for strangulation or bowel obstruction. No appendicitis, diverticulitis or abscess.  3.  No radiopaque gallstone  4.  calcification or metallic density along the lateral hepatic capsule consistent with postsurgical change.  5.  Enlarged prostate  6.  Hermosillo catheter in the bladder.   Basic metabolic panel   Result Value Ref Range    Sodium 133 (L) 136 - 145 mmol/L    Potassium 3.7 3.5 - 5.0 mmol/L    Chloride 98 98 - 107 mmol/L    Carbon Dioxide (CO2) 25 22 - 31 mmol/L    Anion Gap 10 5 - 18 mmol/L    Urea Nitrogen 23 8 - 28 mg/dL    Creatinine 1.06 0.70 - 1.30 mg/dL    Calcium 9.1 8.5 - 10.5 mg/dL    Glucose 104 70 - 125 mg/dL    GFR Estimate 71 >60 mL/min/1.73m2   UA with Microscopic reflex to Culture    Specimen: Urine, Catheter   Result Value Ref Range    Color Urine Yellow Colorless, Straw, Light Yellow, Yellow    Appearance Urine Turbid (A) Clear    Glucose Urine Negative Negative mg/dL    Bilirubin Urine Negative Negative    Ketones Urine Negative Negative mg/dL    Specific Gravity Urine 1.014 1.001 - 1.030    Blood Urine 0.2 mg/dL (A) Negative    pH Urine 7.0 5.0 - 7.0    Protein Albumin Urine 10  (A) Negative mg/dL    Urobilinogen Urine 4.0 (A) <2.0 mg/dL    Nitrite Urine Negative Negative    Leukocyte Esterase Urine 25 Quinn/uL (A) Negative    Bacteria Urine Few (A) None Seen /HPF    Mucus Urine Present (A) None  Seen /LPF    Amorphous Crystals Urine Few (A) None Seen /HPF    RBC Urine 77 (H) <=2 /HPF    WBC Urine 12 (H) <=5 /HPF    Squamous Epithelials Urine 1 <=1 /HPF    Transitional Epithelials Urine <1 <=1 /HPF    Hyaline Casts Urine 3 (H) <=2 /LPF   CBC with platelets and differential   Result Value Ref Range    WBC Count 9.0 4.0 - 11.0 10e3/uL    RBC Count 3.82 (L) 4.40 - 5.90 10e6/uL    Hemoglobin 12.8 (L) 13.3 - 17.7 g/dL    Hematocrit 40.0 40.0 - 53.0 %     (H) 78 - 100 fL    MCH 33.5 (H) 26.5 - 33.0 pg    MCHC 32.0 31.5 - 36.5 g/dL    RDW 12.8 10.0 - 15.0 %    Platelet Count 181 150 - 450 10e3/uL    % Neutrophils 79 %    % Lymphocytes 7 %    % Monocytes 11 %    % Eosinophils 1 %    % Basophils 1 %    % Immature Granulocytes 1 %    NRBCs per 100 WBC 0 <1 /100    Absolute Neutrophils 7.3 1.6 - 8.3 10e3/uL    Absolute Lymphocytes 0.6 (L) 0.8 - 5.3 10e3/uL    Absolute Monocytes 1.0 0.0 - 1.3 10e3/uL    Absolute Eosinophils 0.1 0.0 - 0.7 10e3/uL    Absolute Basophils 0.1 0.0 - 0.2 10e3/uL    Absolute Immature Granulocytes 0.1 <=0.4 10e3/uL    Absolute NRBCs 0.0 10e3/uL       RADIOLOGY:  Reviewed all pertinent imaging. Please see official radiology report.  CT Abdomen Pelvis w Contrast   Final Result   IMPRESSION:    1.  Cardiomegaly with bilateral pleural effusions suggesting possible congestive heart failure.   2.  Large left inguinal hernia containing loops of colon with bowel extending into the scrotum no evidence for strangulation or bowel obstruction. No appendicitis, diverticulitis or abscess.   3.  No radiopaque gallstone   4.  calcification or metallic density along the lateral hepatic capsule consistent with postsurgical change.   5.  Enlarged prostate   6.  Hermosillo catheter in the bladder.          EKG:    None.     PROCEDURES:   None.       I, Aracelis Waldron, am serving as a scribe to document services personally performed by Ata Mcgowan PA-C based on my observation and the provider's statements  to me. I, Ata Mcgowan PA-C attest that Aracelis Vanita is acting in a scribe capacity, has observed my performance of the services and has documented them in accordance with my direction.    Ata Mcgowan PA-C  Emergency Medicine  [facility]      Ata Mcgowan PA-C  05/03/22 1710

## 2022-05-03 NOTE — PROGRESS NOTES
NOT FORMAL CONSULT, Phone discussion with ELSI Mcgowan PA-C  Place of Service:  [facility] ED      Patient currently early in workup in ED for urinary retention per ED provider after recent UTI with love catheter removed through PCP yesterday. Love catheter initially paced in ED on 4/26 for urinary retention and UTI.    Bladder scan with 1L retained urine. Love catheter placed.  Does have leukocytosis  Ua pending  BMP pending  No imaging yet    LABS:  Creatinine   Date Value Ref Range Status   04/26/2022 0.96 0.70 - 1.30 mg/dL Final     WBC Count   Date Value Ref Range Status   04/26/2022 16.7 (H) 4.0 - 11.0 10e3/uL Final     Hemoglobin   Date Value Ref Range Status   04/26/2022 14.2 13.3 - 17.7 g/dL Final   ]  Platelet Count   Date Value Ref Range Status   04/26/2022 157 150 - 450 10e3/uL Final           Discussion/PLAN:  Richard Millan is currently being seen by ED provider being evaluated for urinary retention with leucocytosis and multiple labs pending. No imagine yet. Recently treated for UTI with bactrim culture from 4/26 without growth. This is not a formal consult, but recommnedations base on phone discussion and labs available at the time.    -Documented vitals with abnormality, ED PA aware of bradycardia of 39, will recheck and treat accordingly  - Recommend BMP and UA, treat accordingly  screen for prostatitis symptoms, if present would treat for prostatitis. If concerns for acute prostatatitis do not preform MARCIA  - if creatinine elevated would recommend CT abdo/pelvs to ensure no obstructive uropathy  -Would start tamsulosin 0.4 mg daily if no contraindication  -1 L retained urine, maintain love catheter will need  TOV in 2 week with [facility]. Email sent to office staff and info in discharge navigator  -If Ed workup with significant finding or patient significantly ill would defer disposition to treating provider  -If new results/urologic concerns or abnormalities [please place formal consult to  [facility]        Oneida Lovelace PA-C  [facility]   [phone]

## 2022-05-03 NOTE — ED PROVIDER NOTES
Emergency Department Midlevel Supervisory Note     I personally saw the patient and performed a substantive portion of the visit including all aspects of the medical decision making.    ED Course:  9:16 AM Darius Mcgowan PA-C staffed patient with me. I agree with their assessment and plan of management, and I will see the patient.  9:41 AM I met with the patient to introduce myself, gather additional history, perform my initial exam, and discuss the plan.     Brief HPI:     Richard Millan is a 82 year old male who presents for evaluation of urinary retention. Patient was seen in ED on 4/26 (1 week ago) for urinary retention and had a love catheter placed. The catheter was removed yesterday (5/2) in clinic and patient has not been able to void since then. Associated lower abdominal pain. Patient has not been seen by urology yet. Bladder scan today showed greater than 1000 mL urine.    I, Molly Mahajan am serving as a scribe to document services personally performed by Dr. Urrutia, based on my observations and the provider's statements to me.   I, Dr. Urrutia, attest that Molly Mahajan was acting in a scribe capacity, has observed my performance of the services and has documented them in accordance with my direction.    Brief Physical Exam:  Physical Exam  Vitals reviewed.   Constitutional:       General: He is not in acute distress.     Appearance: Normal appearance. He is not toxic-appearing or diaphoretic.   HENT:      Head: Normocephalic.   Cardiovascular:      Rate and Rhythm: Regular rhythm. Bradycardia present.   Pulmonary:      Effort: Pulmonary effort is normal. No respiratory distress.      Breath sounds: Normal breath sounds. No wheezing or rales.   Abdominal:      General: Abdomen is flat. Bowel sounds are normal.      Tenderness: There is no right CVA tenderness, left CVA tenderness, guarding or rebound.   Musculoskeletal:         General: No swelling or tenderness. Normal range of motion.      Right  lower leg: No edema.      Left lower leg: No edema.   Neurological:      General: No focal deficit present.      Mental Status: He is alert and oriented to person, place, and time.      MDM:  Patient with most likely urinary retention secondary to prostatic hypertrophy.  Hermosillo was replaced.  CT scan shows an inguinal hernia but the patient does not have any signs or symptoms of strangulation.  Will discharge with urology follow-up and catheter in place.      No diagnosis found.    Labs and Imaging:     I have reviewed the relevant laboratory and radiology studies    Procedures:  I was present for the key portions of this procedure    Richard Urrutia MD  [facility] EMERGENCY DEPARTMENT  [address] [place] [id number]  [phone]       Richard Urrutia MD  05/03/22 9515

## 2022-05-16 NOTE — TELEPHONE ENCOUNTER
Received a voicemail from Richard who requests a refill of his furosemide. He is due to see CY in clinic but recently has been experiencing urinary retention and has had x2 ER visits and subsequent catheterizations. He is following with Urology. Will update CY. -[facility]         Noted that he also called [facility] team and it was refilled there. Will update CY all the same. -[facility]        Fish,  Pt called both myself and [facility] team for Furosemide refill. It was addressed by [facility] team. He wanted me to pass along that he has been later to follow up due to acute issues with urinary retention + catheterizations that have had to be repeated. He willl follow up once it has been straightened out. Blood work/BMP has been repeated with his ER visits so we at least have follow up blood work on file.  Mal

## 2022-07-13 NOTE — TELEPHONE ENCOUNTER
Received a call from Олег. He calls in to report that he just needs a refill of his Furosemide. This was done and sent to [facility] off [address]. We confirmed time and date for his next appt with RIANA gonzalez

## 2022-07-20 NOTE — PROGRESS NOTES
Assessment/Recommendations   Assessment:    1.  Dilated cardiomyopathy with acute on chronic systolic heart failure, NYHA class III: Most recent NT proBNP level is in 5000's on 7/5/2022.  Patient continues to have shortness of breath on exertion although some improvement.    He has some crackles in bilateral bases.  He reports weight loss of approximately 10 pounds since he saw me in April.    His current home weight is stable between 132 to 135 pounds.  He is trying to keep his sodium intake less than 2 g/day   He reports drinking more than 60 ounces of fluid per day.  His lower extremity edema has resolved.    He denies shortness of breath at rest, PND orthopnea.    Most recent echocardiogram showed LVEF severely reduced to 25% with moderate to severe mitral insufficiency.    Heart failure regimen includes:    -Beta-blocker therapy with metoprolol succinate 12.5 mg daily    -ARB Losartan 50 mg daily    -Diuretic therapy with furosemide 40 mg 3 times a day    2.  Permanent atrial fibrillation: Rate controlled on Metroprolol succinate.  Not on anticoagulation therapy per patient's decision.    3.  Weight loss: Patient had weight loss of 20 pounds in the last 1 year.  He denies further weight loss.  He reports fair appetite.    4.  Hypertension: Blood pressure is stable.  On losartan and metoprolol succinate.    Plan/Recommendation:  -We discussed about increasing his diuretic therapy given his ongoing shortness of breath and significant elevated NT proBNP level.  Patient prefers to have a repeat blood work today and then will consider going up on diuretic therapy if needed.  -BNP level pending  -Reinforced low-sodium diet and daily weight monitoring  -Will determine follow-up visit with me after lab results back    Patient saw PCP on 7/5/2022.  Per his note, patient is advised that he is not a good candidate for surgery.    Follow up with me in 1 to 2 weeks     History of Present Illness/Subjective      Richard Millan is a 82 year old male with a past medical history of chronic atrial fibrillation, hypertension, dilated cardiomyopathy with acute on chronic systolic heart failure with status post ICD who is seen at [facility] for heart failure for continued heart failure follow-up.    Patient was seen by Dr. Pineda in January.  He was recommended to have repeat echocardiogram to reassess his LV systolic function and filling pressure.  His repeat echo showed severely reduced LVEF of 25% with moderate to severe mitral insufficiency, moderate tricuspid insufficiency, with moderately increased right atrial pressure.    During his last clinic visit with me in April, he noted 50% improvement in shortness of breath since taking furosemide.  However on assessment, he was still retaining fluid.  Therefore he was instructed to increase his diuretic therapy and follow-up with me in 2 weeks with repeat blood work.      Today, patient tells me that after he saw me he had a pneumonia.  It appears that he was in the ED twice with urinary retention.  His chest CT showed cardio large man and bilateral pleural effusion with right side greater than left side.  He reports overall improvement in his shortness of breath since he saw me last time although he still gets shortness of breath on exertion.  His physical activity is limited from shortness of breath and arthritic pain.  He takes Tylenol as needed for pain.  He reports fair appetite.  He denies further weight loss.  He states his weight has been stable.  He tries to avoid salty food.    Patient states that his wife is wondering if patient can have his prostate surgery.  He has already been told by his PCP that he is not a candidate for surgery.  He has Hermosillo catheter in place for his urinary retention issues.     He denies fatigue, shortness of breath, orthopnea, PND, palpitations and chest pain.  He felt lightheaded for couple days when  it was hot and humid which now has resolved.     ECHO on 4/6/22-Reviewed:   Interpretation Summary   1. The left ventricle is mildly enlarged. Left ventricular systolic  performance is severely reduced. The ejection fraction is estimated to be 25%.  2. There is severe global reduction in left ventricular systolic performance.  3. There is mild aortic insufficiency.  4. There is moderate to severe mitral insufficiency.  5. There is moderate tricuspid insufficiency.  6. There is moderate right ventricular enlargement with moderately reduced  right ventricular systolic performance.  7. There is severe biatrial enlargement.  8. Right ventricular systolic pressure relative to right atrial pressure is  moderately increased. The pulmonary artery pressure is estimated to be 50 mmHg  plus right atrial pressure. In addition, the IVC appears dilated with  decreased phasic variation in caval diameter consistent with elevated right  atrial pressure.  9. There is a fairly small pericardial effusion. There is no evidence of  significant intraventricular dependence/tamponade physiology.     The prior real-time echocardiogram could not be accessed from the digital  archive for direct comparison.     Physical Examination Review of Systems   There were no vitals taken for this visit.  There is no height or weight on file to calculate BMI.  Wt Readings from Last 3 Encounters:   05/03/22 66.7 kg (147 lb)   04/26/22 66.7 kg (147 lb)   04/21/22 67.5 kg (148 lb 12.8 oz)     General Appearance:   no distress, normal body habitus   ENT/Mouth: membranes moist, no oral lesions or bleeding gums.      EYES:  no scleral icterus, normal conjunctivae   Neck: no carotid bruits or thyromegaly   Chest/Lungs:   lungs are clear to auscultation, no rales or wheezing, equal chest wall expansion except diminished lung sounds and crackles in bilateral bases   Cardiovascular:    Normal first and second heart sounds with no murmurs, rubs, or gallops; the  carotid, radial and posterior tibial pulses are intact, Jugular venous pressure flat, trace pitting  edema bilaterally    Abdomen:  no organomegaly, masses, bruits, or tenderness; bowel sounds are present   Extremities   no cyanosis or clubbing   Radial pulses and Pedal pulses intact and symmetrical.  CMS intact.   Skin: no xanthelasma, warm.    Neurologic: normal  bilateral, no tremors     Psychiatric: alert and oriented x3, calm            Negative unless noted in HPI     Medical History  Surgical History Family History Social History   Past Medical History:   Diagnosis Date     Arrhythmia     atrial fibrillation     Hypertension     No past surgical history on file. Family History   Problem Relation Age of Onset     Sudden Death Brother 50.00        no autopsy    Social History     Socioeconomic History     Marital status:      Spouse name: Not on file     Number of children: Not on file     Years of education: Not on file     Highest education level: Not on file   Occupational History     Not on file   Tobacco Use     Smoking status: Former Smoker     Quit date: 9/15/1970     Years since quittin.8     Smokeless tobacco: Never Used   Substance and Sexual Activity     Alcohol use: Not on file     Drug use: No     Sexual activity: Not on file   Other Topics Concern     Not on file   Social History Narrative     Not on file     Social Determinants of Health     Financial Resource Strain: Not on file   Food Insecurity: Not on file   Transportation Needs: Not on file   Physical Activity: Not on file   Stress: Not on file   Social Connections: Not on file   Intimate Partner Violence: Not on file   Housing Stability: Not on file          Medications  Allergies   Current Outpatient Medications   Medication Sig Dispense Refill     brimonidine (ALPHAGAN) 0.2 % ophthalmic solution [BRIMONIDINE (ALPHAGAN) 0.2 % OPHTHALMIC SOLUTION] Administer 1 drop to both eyes 2 (two) times a day.       dorzolamide  (TRUSOPT) 2 % ophthalmic solution [DORZOLAMIDE (TRUSOPT) 2 % OPHTHALMIC SOLUTION] Administer 1 drop to both eyes daily.       FOLIC ACID/MULTIVIT-MIN/LUTEIN (CENTRUM SILVER ORAL) [FOLIC ACID/MULTIVIT-MIN/LUTEIN (CENTRUM SILVER ORAL)] Take 1 tablet by mouth daily.       furosemide (LASIX) 40 MG tablet TAKE 2 TABLETS BY MOUTH IN THE MORNING AND 1 TABLET IN THE EVENING 270 tablet 1     latanoprost (XALATAN) 0.005 % ophthalmic solution [LATANOPROST (XALATAN) 0.005 % OPHTHALMIC SOLUTION] Administer 1 drop to both eyes daily.  4     losartan (COZAAR) 50 MG tablet TAKE 1 TABLET(50 MG) BY MOUTH DAILY 90 tablet 1     metoprolol succinate (TOPROL-XL) 25 MG [METOPROLOL SUCCINATE (TOPROL-XL) 25 MG] Take 0.5 tablets (12.5 mg total) by mouth daily. 45 tablet 3     VYZULTA 0.024 % Drop Place 1 drop into both eyes daily      Allergies   Allergen Reactions     Other Environmental Allergy Other (See Comments)     Hayfever         Lab Results    Chemistry/lipid CBC Cardiac Enzymes/BNP/TSH/INR   Lab Results   Component Value Date    CHOL 141 07/05/2022    HDL 51 07/05/2022    TRIG 72 07/05/2022    BUN 18.5 07/05/2022     07/05/2022    CO2 34 (H) 07/05/2022    Lab Results   Component Value Date    WBC 7.5 07/05/2022    HGB 12.8 (L) 07/05/2022    HCT 39.0 (L) 07/05/2022     (H) 07/05/2022     07/05/2022    Lab Results   Component Value Date    BNP 1,079 (H) 05/16/2022    TSH 0.92 07/02/2021        39  minutes spent on the date of encounter doing chart review, review of test results, interpretation with above tests, patient visit and documentation.      This note has been dictated using voice recognition software. Any grammatical, typographical, or context distortions are unintentional and inherent to the software

## 2022-07-21 NOTE — LETTER
7/21/2022    Ilia Murray MD  [facility] [address]  [place] [address]    RE: Richard FRIEND Yana       Dear Colleague,     I had the pleasure of seeing Richard Josephkyrie in the [facility].          Assessment/Recommendations   Assessment:    1.  Dilated cardiomyopathy with acute on chronic systolic heart failure, NYHA class III: Most recent NT proBNP level is in 5000's on 7/5/2022.  Patient continues to have shortness of breath on exertion although some improvement.    He has some crackles in bilateral bases.  He reports weight loss of approximately 10 pounds since he saw me in April.    His current home weight is stable between 132 to 135 pounds.  He is trying to keep his sodium intake less than 2 g/day   He reports drinking more than 60 ounces of fluid per day.  His lower extremity edema has resolved.    He denies shortness of breath at rest, PND orthopnea.    Most recent echocardiogram showed LVEF severely reduced to 25% with moderate to severe mitral insufficiency.    Heart failure regimen includes:    -Beta-blocker therapy with metoprolol succinate 12.5 mg daily    -ARB Losartan 50 mg daily    -Diuretic therapy with furosemide 40 mg 3 times a day    2.  Permanent atrial fibrillation: Rate controlled on Metroprolol succinate.  Not on anticoagulation therapy per patient's decision.    3.  Weight loss: Patient had weight loss of 20 pounds in the last 1 year.  He denies further weight loss.  He reports fair appetite.    4.  Hypertension: Blood pressure is stable.  On losartan and metoprolol succinate.    Plan/Recommendation:  -We discussed about increasing his diuretic therapy given his ongoing shortness of breath and significant elevated NT proBNP level.  Patient prefers to have a repeat blood work today and then will consider going up on diuretic therapy if needed.  -BNP level pending  -Reinforced low-sodium diet and daily weight monitoring  -Will determine follow-up  visit with me after lab results back    Patient saw PCP on 7/5/2022.  Per his note, patient is advised that he is not a good candidate for surgery.    Follow up with me in 1 to 2 weeks     History of Present Illness/Subjective    Mr. Richard Millan is a 82 year old male with a past medical history of chronic atrial fibrillation, hypertension, dilated cardiomyopathy with acute on chronic systolic heart failure with status post ICD who is seen at [facility] for heart failure for continued heart failure follow-up.    Patient was seen by Dr. Pineda in January.  He was recommended to have repeat echocardiogram to reassess his LV systolic function and filling pressure.  His repeat echo showed severely reduced LVEF of 25% with moderate to severe mitral insufficiency, moderate tricuspid insufficiency, with moderately increased right atrial pressure.    During his last clinic visit with me in April, he noted 50% improvement in shortness of breath since taking furosemide.  However on assessment, he was still retaining fluid.  Therefore he was instructed to increase his diuretic therapy and follow-up with me in 2 weeks with repeat blood work.      Today, patient tells me that after he saw me he had a pneumonia.  It appears that he was in the ED twice with urinary retention.  His chest CT showed cardio large man and bilateral pleural effusion with right side greater than left side.  He reports overall improvement in his shortness of breath since he saw me last time although he still gets shortness of breath on exertion.  His physical activity is limited from shortness of breath and arthritic pain.  He takes Tylenol as needed for pain.  He reports fair appetite.  He denies further weight loss.  He states his weight has been stable.  He tries to avoid salty food.    Patient states that his wife is wondering if patient can have his prostate surgery.  He has already been told by his PCP that  he is not a candidate for surgery.  He has Hermosillo catheter in place for his urinary retention issues.     He denies fatigue, shortness of breath, orthopnea, PND, palpitations and chest pain.  He felt lightheaded for couple days when it was hot and humid which now has resolved.     ECHO on 4/6/22-Reviewed:   Interpretation Summary   1. The left ventricle is mildly enlarged. Left ventricular systolic  performance is severely reduced. The ejection fraction is estimated to be 25%.  2. There is severe global reduction in left ventricular systolic performance.  3. There is mild aortic insufficiency.  4. There is moderate to severe mitral insufficiency.  5. There is moderate tricuspid insufficiency.  6. There is moderate right ventricular enlargement with moderately reduced  right ventricular systolic performance.  7. There is severe biatrial enlargement.  8. Right ventricular systolic pressure relative to right atrial pressure is  moderately increased. The pulmonary artery pressure is estimated to be 50 mmHg  plus right atrial pressure. In addition, the IVC appears dilated with  decreased phasic variation in caval diameter consistent with elevated right  atrial pressure.  9. There is a fairly small pericardial effusion. There is no evidence of  significant intraventricular dependence/tamponade physiology.     The prior real-time echocardiogram could not be accessed from the digital  archive for direct comparison.     Physical Examination Review of Systems   There were no vitals taken for this visit.  There is no height or weight on file to calculate BMI.  Wt Readings from Last 3 Encounters:   05/03/22 66.7 kg (147 lb)   04/26/22 66.7 kg (147 lb)   04/21/22 67.5 kg (148 lb 12.8 oz)     General Appearance:   no distress, normal body habitus   ENT/Mouth: membranes moist, no oral lesions or bleeding gums.      EYES:  no scleral icterus, normal conjunctivae   Neck: no carotid bruits or thyromegaly   Chest/Lungs:   lungs are  clear to auscultation, no rales or wheezing, equal chest wall expansion except diminished lung sounds and crackles in bilateral bases   Cardiovascular:    Normal first and second heart sounds with no murmurs, rubs, or gallops; the carotid, radial and posterior tibial pulses are intact, Jugular venous pressure flat, trace pitting  edema bilaterally    Abdomen:  no organomegaly, masses, bruits, or tenderness; bowel sounds are present   Extremities   no cyanosis or clubbing   Radial pulses and Pedal pulses intact and symmetrical.  CMS intact.   Skin: no xanthelasma, warm.    Neurologic: normal  bilateral, no tremors     Psychiatric: alert and oriented x3, calm            Negative unless noted in HPI     Medical History  Surgical History Family History Social History   Past Medical History:   Diagnosis Date     Arrhythmia     atrial fibrillation     Hypertension     No past surgical history on file. Family History   Problem Relation Age of Onset     Sudden Death Brother 50.00        no autopsy    Social History     Socioeconomic History     Marital status:      Spouse name: Not on file     Number of children: Not on file     Years of education: Not on file     Highest education level: Not on file   Occupational History     Not on file   Tobacco Use     Smoking status: Former Smoker     Quit date: 9/15/1970     Years since quittin.8     Smokeless tobacco: Never Used   Substance and Sexual Activity     Alcohol use: Not on file     Drug use: No     Sexual activity: Not on file   Other Topics Concern     Not on file   Social History Narrative     Not on file     Social Determinants of Health     Financial Resource Strain: Not on file   Food Insecurity: Not on file   Transportation Needs: Not on file   Physical Activity: Not on file   Stress: Not on file   Social Connections: Not on file   Intimate Partner Violence: Not on file   Housing Stability: Not on file          Medications  Allergies   Current  Outpatient Medications   Medication Sig Dispense Refill     brimonidine (ALPHAGAN) 0.2 % ophthalmic solution [BRIMONIDINE (ALPHAGAN) 0.2 % OPHTHALMIC SOLUTION] Administer 1 drop to both eyes 2 (two) times a day.       dorzolamide (TRUSOPT) 2 % ophthalmic solution [DORZOLAMIDE (TRUSOPT) 2 % OPHTHALMIC SOLUTION] Administer 1 drop to both eyes daily.       FOLIC ACID/MULTIVIT-MIN/LUTEIN (CENTRUM SILVER ORAL) [FOLIC ACID/MULTIVIT-MIN/LUTEIN (CENTRUM SILVER ORAL)] Take 1 tablet by mouth daily.       furosemide (LASIX) 40 MG tablet TAKE 2 TABLETS BY MOUTH IN THE MORNING AND 1 TABLET IN THE EVENING 270 tablet 1     latanoprost (XALATAN) 0.005 % ophthalmic solution [LATANOPROST (XALATAN) 0.005 % OPHTHALMIC SOLUTION] Administer 1 drop to both eyes daily.  4     losartan (COZAAR) 50 MG tablet TAKE 1 TABLET(50 MG) BY MOUTH DAILY 90 tablet 1     metoprolol succinate (TOPROL-XL) 25 MG [METOPROLOL SUCCINATE (TOPROL-XL) 25 MG] Take 0.5 tablets (12.5 mg total) by mouth daily. 45 tablet 3     VYZULTA 0.024 % Drop Place 1 drop into both eyes daily      Allergies   Allergen Reactions     Other Environmental Allergy Other (See Comments)     Hayfever         Lab Results    Chemistry/lipid CBC Cardiac Enzymes/BNP/TSH/INR   Lab Results   Component Value Date    CHOL 141 07/05/2022    HDL 51 07/05/2022    TRIG 72 07/05/2022    BUN 18.5 07/05/2022     07/05/2022    CO2 34 (H) 07/05/2022    Lab Results   Component Value Date    WBC 7.5 07/05/2022    HGB 12.8 (L) 07/05/2022    HCT 39.0 (L) 07/05/2022     (H) 07/05/2022     07/05/2022    Lab Results   Component Value Date    BNP 1,079 (H) 05/16/2022    TSH 0.92 07/02/2021        39  minutes spent on the date of encounter doing chart review, review of test results, interpretation with above tests, patient visit and documentation.      This note has been dictated using voice recognition software. Any grammatical, typographical, or context distortions are unintentional and  inherent to the software          Thank you for allowing me to participate in the care of your patient.      Sincerely,     AJ Mejía CNP     [facility]  cc:   AJ Mejía CNP  [address]  [place] [address]

## 2022-07-21 NOTE — PATIENT INSTRUCTIONS
Richard Millan,    It was a pleasure to see you today at the [facility].     My recommendations after this visit include:    - No medications changes made today    - We will follow up with you once your lab result is back    - Keep your sodium intake <2000 mg per day and daily weight monitoring    - Please call BEBE Veliz on [phone]  if you have any questions or concerns    Fish Brown CNP

## 2022-08-01 NOTE — PROGRESS NOTES
Assessment/Recommendations   Assessment:    1.  Dilated cardiomyopathy with acute on chronic systolic heart failure, NYHA class III: Most recent NT proBNP level is in 5000's on 7/5/2022.  His BNP was in the 1200s on 7/21/2022.     Most recent echocardiogram showed LVEF severely reduced to 25% with moderate to severe mitral insufficiency in April 2022.  Furosemide was increased from 40 mg 3 times a day to 80 mg in a.m. and 40 mg at noon and 40 mg in the evening.     He reports feeling improvement in his shortness of breath.     On assessment he is well compensated with no evidence of fluid retention.   He reports weight loss of approximately 10 pounds since he saw me in April.    Current home weight is stable between 132 to 135 pounds.  He is trying to keep his sodium intake less than 2 g/day.  He reports drinking more than 64 ounces per day.      Heart failure regimen includes:    -Beta-blocker therapy with metoprolol succinate 12.5 mg daily    -ARB Losartan 50 mg daily    -Diuretic therapy with furosemide -he takes it 40 mg 4 times a day per patient's preference    BMP stable today except potassium mildly low at 3.4.  We discussed about initiating low-dose of potassium supplement.  Patient prefers to increase dietary intake of food high in potassium.    2.  Permanent atrial fibrillation: Heart rate stable on Metroprolol succinate.  Not on anticoagulation therapy per patient's decision.    3.  Weight loss: Patient had weight loss of 20 pounds in the last 1 year.  He denies further weight loss.  He reports fair appetite.     4.  Hypertension: Blood pressure stable today.  On losartan and metoprolol succinate.    Plan/Recommendation:  -NT proBNP result pending  -Instructed patient to eat food high in potassium-information provided  -Reinforced to keep sodium intake less than 2 g/day, daily weight monitoring and keep fluid intake between 50 to 60 ounces per day.  -BMP in 2 weeks at PCP office-order  placed    Patient saw PCP on 7/5/2022.  Per his note, patient is advised that he is not a good candidate for surgery.    Per patient, his wife is wondering if Dr. Pineda will clear him for prostate surgery.  He was told that he needs to have prostate removal surgery because of issue with a urinary retention.  We will discuss this with Dr. Pineda and have his nurse follow-up with patient.    He currently has an indwelling Hermosillo catheter.    Patient declined to follow-up with me in a month.  He agreed to follow-up with me in 2 months and he will follow-up with Dr. Pineda in January as recommended.     History of Present Illness/Subjective    Mr. Richard Millan is a 82 year old male with a past medical history of chronic atrial fibrillation, hypertension, dilated cardiomyopathy with acute on chronic systolic heart failure with status post ICD who is seen at [facility] for heart failure for continued heart failure follow-up.    Patient was seen by Dr. Pineda in January.  He was recommended to have repeat echocardiogram to reassess his LV systolic function and filling pressure.  His repeat echo showed severely reduced LVEF of 25% with moderate to severe mitral insufficiency, moderate tricuspid insufficiency, with moderately increased right atrial pressure.    During his last clinic visit patient reported that he actually had a pneumonia.  He was in the ED twice with urinary retention.  His chest CT CT showed cardiac enlargement with bilateral pleural effusion with right side greater than left.  He overall noted improvement in his shortness of breath although he still gets short of breath with exertion.  His physical activity is limited due to shortness of breath and arthritic pain.  He takes Tylenol as needed.  He reports fair appetite.  He denies further weight loss.    Today, Morris reports that he feels better with his shortness of breath since his last visit with me  "with increased dose of furosemide.  He denies any adverse effect.  He denies fatigue, lightheadedness, shortness of breath, orthopnea, PND, palpitations, chest pain, abdominal fullness/bloating and lower extremity edema.      Patient states that his wife is wondering if patient can have his prostate surgery.  He has already been told by his PCP that he is not a candidate for surgery.  He has Hermosillo catheter in place for his urinary retention issues.    ECHO on 4/6/22-Reviewed:   Interpretation Summary   1. The left ventricle is mildly enlarged. Left ventricular systolic  performance is severely reduced. The ejection fraction is estimated to be 25%.  2. There is severe global reduction in left ventricular systolic performance.  3. There is mild aortic insufficiency.  4. There is moderate to severe mitral insufficiency.  5. There is moderate tricuspid insufficiency.  6. There is moderate right ventricular enlargement with moderately reduced  right ventricular systolic performance.  7. There is severe biatrial enlargement.  8. Right ventricular systolic pressure relative to right atrial pressure is  moderately increased. The pulmonary artery pressure is estimated to be 50 mmHg  plus right atrial pressure. In addition, the IVC appears dilated with  decreased phasic variation in caval diameter consistent with elevated right  atrial pressure.  9. There is a fairly small pericardial effusion. There is no evidence of  significant intraventricular dependence/tamponade physiology.     The prior real-time echocardiogram could not be accessed from the digital  archive for direct comparison.     Physical Examination Review of Systems   /50 (BP Location: Right arm, Patient Position: Sitting, Cuff Size: Adult Regular)   Pulse 84   Resp 24   Ht 1.778 m (5' 10\")   Wt 60.6 kg (133 lb 9.6 oz)   BMI 19.17 kg/m    Body mass index is 19.17 kg/m .  Wt Readings from Last 3 Encounters:   08/02/22 60.6 kg (133 lb 9.6 oz)   07/21/22 " 61.1 kg (134 lb 12.8 oz)   22 66.7 kg (147 lb)     General Appearance:   no distress, normal body habitus   ENT/Mouth: membranes moist, no oral lesions or bleeding gums.      EYES:  no scleral icterus, normal conjunctivae   Neck: no carotid bruits or thyromegaly   Chest/Lungs:   lungs are clear to auscultation, no rales or wheezing, equal chest wall expansion except diminished lung sounds   Cardiovascular:    Normal first and second heart sounds with no murmurs, rubs, or gallops; the carotid, radial and posterior tibial pulses are intact, Jugular venous pressure flat,no edema bilaterally    Abdomen:  no organomegaly, masses, bruits, or tenderness; bowel sounds are present   Extremities   no cyanosis or clubbing   Radial pulses and Pedal pulses intact and symmetrical.  CMS intact.   Skin: no xanthelasma, warm.    Neurologic: normal  bilateral, no tremors     Psychiatric: alert and oriented x3, calm            Negative unless noted in HPI     Medical History  Surgical History Family History Social History   Past Medical History:   Diagnosis Date     Arrhythmia     atrial fibrillation     Hypertension     No past surgical history on file. Family History   Problem Relation Age of Onset     Sudden Death Brother 50.00        no autopsy    Social History     Socioeconomic History     Marital status:      Spouse name: Not on file     Number of children: Not on file     Years of education: Not on file     Highest education level: Not on file   Occupational History     Not on file   Tobacco Use     Smoking status: Former Smoker     Quit date: 9/15/1970     Years since quittin.9     Smokeless tobacco: Never Used   Substance and Sexual Activity     Alcohol use: Not on file     Drug use: No     Sexual activity: Not on file   Other Topics Concern     Not on file   Social History Narrative     Not on file     Social Determinants of Health     Financial Resource Strain: Not on file   Food Insecurity: Not on  file   Transportation Needs: Not on file   Physical Activity: Not on file   Stress: Not on file   Social Connections: Not on file   Intimate Partner Violence: Not on file   Housing Stability: Not on file          Medications  Allergies   Current Outpatient Medications   Medication Sig Dispense Refill     acetaminophen (TYLENOL) 500 MG tablet Take 500 mg by mouth every 8 hours as needed for mild pain       brimonidine (ALPHAGAN) 0.2 % ophthalmic solution [BRIMONIDINE (ALPHAGAN) 0.2 % OPHTHALMIC SOLUTION] Administer 1 drop to both eyes 2 (two) times a day.       dorzolamide (TRUSOPT) 2 % ophthalmic solution [DORZOLAMIDE (TRUSOPT) 2 % OPHTHALMIC SOLUTION] Administer 1 drop to both eyes daily.       FOLIC ACID/MULTIVIT-MIN/LUTEIN (CENTRUM SILVER ORAL) [FOLIC ACID/MULTIVIT-MIN/LUTEIN (CENTRUM SILVER ORAL)] Take 1 tablet by mouth daily.       furosemide (LASIX) 40 MG tablet TAKE 2 TABLETS BY MOUTH IN THE MORNING AND 1 TABLET IN THE AFTERNOON AND THEN 1 IN THE EVENING (Patient taking differently: Take 40 mg by mouth every 6 hours) 270 tablet 1     latanoprost (XALATAN) 0.005 % ophthalmic solution [LATANOPROST (XALATAN) 0.005 % OPHTHALMIC SOLUTION] Administer 1 drop to both eyes daily.  4     losartan (COZAAR) 50 MG tablet TAKE 1 TABLET(50 MG) BY MOUTH DAILY 90 tablet 1     metoprolol succinate (TOPROL-XL) 25 MG [METOPROLOL SUCCINATE (TOPROL-XL) 25 MG] Take 0.5 tablets (12.5 mg total) by mouth daily. 45 tablet 3     VYZULTA 0.024 % Drop Place 1 drop into both eyes daily      Allergies   Allergen Reactions     Other Environmental Allergy Other (See Comments)     Hayfever         Lab Results    Chemistry/lipid CBC Cardiac Enzymes/BNP/TSH/INR   Lab Results   Component Value Date    CHOL 141 07/05/2022    HDL 51 07/05/2022    TRIG 72 07/05/2022    BUN 18 08/02/2022     08/02/2022    CO2 28 08/02/2022    Lab Results   Component Value Date    WBC 7.5 07/05/2022    HGB 12.8 (L) 07/05/2022    HCT 39.0 (L) 07/05/2022    MCV  104 (H) 07/05/2022     07/05/2022    Lab Results   Component Value Date    BNP 1,268 (H) 07/21/2022    TSH 0.92 07/02/2021        34  minutes spent on the date of encounter doing chart review, review of test results, interpretation with above tests, patient visit and documentation.      This note has been dictated using voice recognition software. Any grammatical, typographical, or context distortions are unintentional and inherent to the software

## 2022-08-02 NOTE — LETTER
8/2/2022    Ilia Murray MD  [facility] [address]  [facility] [place] [facility] [address]    RE: Richard Millan       Dear Colleague,     I had the pleasure of seeing Richard Millan in the [facility].          Assessment/Recommendations   Assessment:    1.  Dilated cardiomyopathy with acute on chronic systolic heart failure, NYHA class III: Most recent NT proBNP level is in 5000's on 7/5/2022.  His BNP was in the 1200s on 7/21/2022.     Most recent echocardiogram showed LVEF severely reduced to 25% with moderate to severe mitral insufficiency in April 2022.  Furosemide was increased from 40 mg 3 times a day to 80 mg in a.m. and 40 mg at noon and 40 mg in the evening.     He reports feeling improvement in his shortness of breath.     On assessment he is well compensated with no evidence of fluid retention.   He reports weight loss of approximately 10 pounds since he saw me in April.    Current home weight is stable between 132 to 135 pounds.  He is trying to keep his sodium intake less than 2 g/day.  He reports drinking more than 64 ounces per day.      Heart failure regimen includes:    -Beta-blocker therapy with metoprolol succinate 12.5 mg daily    -ARB Losartan 50 mg daily    -Diuretic therapy with furosemide -he takes it 40 mg 4 times a day per patient's preference    BMP stable today except potassium mildly low at 3.4.  We discussed about initiating low-dose of potassium supplement.  Patient prefers to increase dietary intake of food high in potassium.    2.  Permanent atrial fibrillation: Heart rate stable on Metroprolol succinate.  Not on anticoagulation therapy per patient's decision.    3.  Weight loss: Patient had weight loss of 20 pounds in the last 1 year.  He denies further weight loss.  He reports fair appetite.     4.  Hypertension: Blood pressure stable today.  On losartan and metoprolol succinate.    Plan/Recommendation:  -NT proBNP result pending  -Instructed patient  to eat food high in potassium-information provided  -Reinforced to keep sodium intake less than 2 g/day, daily weight monitoring and keep fluid intake between 50 to 60 ounces per day.  -BMP in 2 weeks at PCP office-order placed    Patient saw PCP on 7/5/2022.  Per his note, patient is advised that he is not a good candidate for surgery.    Per patient, his wife is wondering if Dr. Pineda will clear him for prostate surgery.  He was told that he needs to have prostate removal surgery because of issue with a urinary retention.  We will discuss this with Dr. Pineda and have his nurse follow-up with patient.    He currently has an indwelling Hermosillo catheter.    Patient declined to follow-up with me in a month.  He agreed to follow-up with me in 2 months and he will follow-up with Dr. Pineda in January as recommended.     History of Present Illness/Subjective    Mr. Richard Millan is a 82 year old male with a past medical history of chronic atrial fibrillation, hypertension, dilated cardiomyopathy with acute on chronic systolic heart failure with status post ICD who is seen at [facility] for heart failure for continued heart failure follow-up.    Patient was seen by Dr. Pineda in January.  He was recommended to have repeat echocardiogram to reassess his LV systolic function and filling pressure.  His repeat echo showed severely reduced LVEF of 25% with moderate to severe mitral insufficiency, moderate tricuspid insufficiency, with moderately increased right atrial pressure.    During his last clinic visit patient reported that he actually had a pneumonia.  He was in the ED twice with urinary retention.  His chest CT CT showed cardiac enlargement with bilateral pleural effusion with right side greater than left.  He overall noted improvement in his shortness of breath although he still gets short of breath with exertion.  His physical activity is limited due to shortness of  breath and arthritic pain.  He takes Tylenol as needed.  He reports fair appetite.  He denies further weight loss.    Today, Morris reports that he feels better with his shortness of breath since his last visit with me with increased dose of furosemide.  He denies any adverse effect.  He denies fatigue, lightheadedness, shortness of breath, orthopnea, PND, palpitations, chest pain, abdominal fullness/bloating and lower extremity edema.      Patient states that his wife is wondering if patient can have his prostate surgery.  He has already been told by his PCP that he is not a candidate for surgery.  He has Hermosillo catheter in place for his urinary retention issues.    ECHO on 4/6/22-Reviewed:   Interpretation Summary   1. The left ventricle is mildly enlarged. Left ventricular systolic  performance is severely reduced. The ejection fraction is estimated to be 25%.  2. There is severe global reduction in left ventricular systolic performance.  3. There is mild aortic insufficiency.  4. There is moderate to severe mitral insufficiency.  5. There is moderate tricuspid insufficiency.  6. There is moderate right ventricular enlargement with moderately reduced  right ventricular systolic performance.  7. There is severe biatrial enlargement.  8. Right ventricular systolic pressure relative to right atrial pressure is  moderately increased. The pulmonary artery pressure is estimated to be 50 mmHg  plus right atrial pressure. In addition, the IVC appears dilated with  decreased phasic variation in caval diameter consistent with elevated right  atrial pressure.  9. There is a fairly small pericardial effusion. There is no evidence of  significant intraventricular dependence/tamponade physiology.     The prior real-time echocardiogram could not be accessed from the digital  archive for direct comparison.     Physical Examination Review of Systems   /50 (BP Location: Right arm, Patient Position: Sitting, Cuff Size: Adult  "Regular)   Pulse 84   Resp 24   Ht 1.778 m (5' 10\")   Wt 60.6 kg (133 lb 9.6 oz)   BMI 19.17 kg/m    Body mass index is 19.17 kg/m .  Wt Readings from Last 3 Encounters:   22 60.6 kg (133 lb 9.6 oz)   22 61.1 kg (134 lb 12.8 oz)   22 66.7 kg (147 lb)     General Appearance:   no distress, normal body habitus   ENT/Mouth: membranes moist, no oral lesions or bleeding gums.      EYES:  no scleral icterus, normal conjunctivae   Neck: no carotid bruits or thyromegaly   Chest/Lungs:   lungs are clear to auscultation, no rales or wheezing, equal chest wall expansion except diminished lung sounds   Cardiovascular:    Normal first and second heart sounds with no murmurs, rubs, or gallops; the carotid, radial and posterior tibial pulses are intact, Jugular venous pressure flat,no edema bilaterally    Abdomen:  no organomegaly, masses, bruits, or tenderness; bowel sounds are present   Extremities   no cyanosis or clubbing   Radial pulses and Pedal pulses intact and symmetrical.  CMS intact.   Skin: no xanthelasma, warm.    Neurologic: normal  bilateral, no tremors     Psychiatric: alert and oriented x3, calm            Negative unless noted in HPI     Medical History  Surgical History Family History Social History   Past Medical History:   Diagnosis Date     Arrhythmia     atrial fibrillation     Hypertension     No past surgical history on file. Family History   Problem Relation Age of Onset     Sudden Death Brother 50.00        no autopsy    Social History     Socioeconomic History     Marital status:      Spouse name: Not on file     Number of children: Not on file     Years of education: Not on file     Highest education level: Not on file   Occupational History     Not on file   Tobacco Use     Smoking status: Former Smoker     Quit date: 9/15/1970     Years since quittin.9     Smokeless tobacco: Never Used   Substance and Sexual Activity     Alcohol use: Not on file     Drug use: No "     Sexual activity: Not on file   Other Topics Concern     Not on file   Social History Narrative     Not on file     Social Determinants of Health     Financial Resource Strain: Not on file   Food Insecurity: Not on file   Transportation Needs: Not on file   Physical Activity: Not on file   Stress: Not on file   Social Connections: Not on file   Intimate Partner Violence: Not on file   Housing Stability: Not on file          Medications  Allergies   Current Outpatient Medications   Medication Sig Dispense Refill     acetaminophen (TYLENOL) 500 MG tablet Take 500 mg by mouth every 8 hours as needed for mild pain       brimonidine (ALPHAGAN) 0.2 % ophthalmic solution [BRIMONIDINE (ALPHAGAN) 0.2 % OPHTHALMIC SOLUTION] Administer 1 drop to both eyes 2 (two) times a day.       dorzolamide (TRUSOPT) 2 % ophthalmic solution [DORZOLAMIDE (TRUSOPT) 2 % OPHTHALMIC SOLUTION] Administer 1 drop to both eyes daily.       FOLIC ACID/MULTIVIT-MIN/LUTEIN (CENTRUM SILVER ORAL) [FOLIC ACID/MULTIVIT-MIN/LUTEIN (CENTRUM SILVER ORAL)] Take 1 tablet by mouth daily.       furosemide (LASIX) 40 MG tablet TAKE 2 TABLETS BY MOUTH IN THE MORNING AND 1 TABLET IN THE AFTERNOON AND THEN 1 IN THE EVENING (Patient taking differently: Take 40 mg by mouth every 6 hours) 270 tablet 1     latanoprost (XALATAN) 0.005 % ophthalmic solution [LATANOPROST (XALATAN) 0.005 % OPHTHALMIC SOLUTION] Administer 1 drop to both eyes daily.  4     losartan (COZAAR) 50 MG tablet TAKE 1 TABLET(50 MG) BY MOUTH DAILY 90 tablet 1     metoprolol succinate (TOPROL-XL) 25 MG [METOPROLOL SUCCINATE (TOPROL-XL) 25 MG] Take 0.5 tablets (12.5 mg total) by mouth daily. 45 tablet 3     VYZULTA 0.024 % Drop Place 1 drop into both eyes daily      Allergies   Allergen Reactions     Other Environmental Allergy Other (See Comments)     Hayfever         Lab Results    Chemistry/lipid CBC Cardiac Enzymes/BNP/TSH/INR   Lab Results   Component Value Date    CHOL 141 07/05/2022    HDL 51  07/05/2022    TRIG 72 07/05/2022    BUN 18 08/02/2022     08/02/2022    CO2 28 08/02/2022    Lab Results   Component Value Date    WBC 7.5 07/05/2022    HGB 12.8 (L) 07/05/2022    HCT 39.0 (L) 07/05/2022     (H) 07/05/2022     07/05/2022    Lab Results   Component Value Date    BNP 1,268 (H) 07/21/2022    TSH 0.92 07/02/2021        34  minutes spent on the date of encounter doing chart review, review of test results, interpretation with above tests, patient visit and documentation.      This note has been dictated using voice recognition software. Any grammatical, typographical, or context distortions are unintentional and inherent to the software          Thank you for allowing me to participate in the care of your patient.      Sincerely,     AJ Mejía CNP     [facility]  cc:   AJ Mejía CNP  [address]  [place] [address]

## 2022-08-02 NOTE — PATIENT INSTRUCTIONS
Richard Millan,    It was a pleasure to see you today at the [facility].     My recommendations after this visit include:    - No medications changes made today    - Repeat BMP (kidney function ) in 2 weeks at primary office- order placed and printed    - Keep your sodium intake <2000 mg per day, daily weight check, and keep your fluid intake between 50-60 ounces per day    - Stay on food high in potassium such as potato, tomato, orange/orange juice, cantaloupe, prunes, nuts and spinach.    - Please call if you develop persistent weight loss with lightheaded, dizziness, excessive thirst, weakness or persistent weight gain with worsening shortness, leg swelling, or abdominal bloating     - Follow up with Fish in 2 months     - Follow up with Dr. Pineda in January     - Please call BEBE Veliz on [phone]  if you have any questions or concerns    Fish Brown, CNP

## 2022-08-11 NOTE — PROGRESS NOTES
Jv Pineda MD  You 5 minutes ago (2:02 PM)     TZ    Start amiodarone 200 mg twice a day and see EP physician      ==Called patient to go over changes and spoke with patients wife. She is not sure what happened because patient was over at a neighbor's and suddenly slid to the ground against the garage and seemed to lose consciousness. She is unsure if he he was shocked but they had already called 911 and ambulance has taken him to [place] as this is the closed hospital to them and easiest for her to access. Update to both Lis and KANU. -[facility]

## 2022-08-11 NOTE — TELEPHONE ENCOUNTER
8/11/22 Called patient to see if symptoms with ICD shock yesterday at 1 pm.  He said he did not feel anything.  He said he was probably sitting around the house.  He also reported when asked that he doesn't feel slow or fast heart rates.  Device shows VT treated with ATP x 1 then 41J shock. Duration 47 seconds.  to 282 bpm.  This was patient's first shock.  Lis Barr, Device RN                                              Histograms show more HR in the 40 bpm range.

## 2022-08-22 NOTE — TELEPHONE ENCOUNTER
8/22/22:  Called patient to see if he wanted to change his in clinic ICD check so that we could schedule him to see Dr Garcia at the same time. He has another appointment on 8/29 with a Urologist right by the  clinic, so he would like to keep his appointment with Device RN.  He said he may be changing his Cardiologist to [place], hasn't decided yet.   Lis Barr, Device RN.

## 2022-09-07 ENCOUNTER — TELEPHONE (OUTPATIENT)
Dept: CARDIOLOGY | Facility: CLINIC | Age: 82
End: 2022-09-07

## 2022-09-07 NOTE — TELEPHONE ENCOUNTER
Per chart review, does not appear to be in any research studies. Call placed to research department to verify if patient is in current studies/trials or if device is needed for anything as this is not usual protocol. Awaiting clarification       Socorro Low RN

## 2022-09-07 NOTE — TELEPHONE ENCOUNTER
Records reviewed.  Richard Millan did not participate in a clinical trial although he was approached to participate in 2016.  Called daughter with update.   Condolences given.  She had no further questions or concerns.     Katya Christianson RN, BSN  Clinical Trials Nurse

## 2022-09-07 NOTE — TELEPHONE ENCOUNTER
Please call daughter Pebbles:    Patient passed away on 22 and his daughter believes he is a part of a research study for his pacemaker.  home would like to know if this should be returned.    Please call daughter to discuss: [phone]

## 2023-04-05 NOTE — TELEPHONE ENCOUNTER
[facility], [facility]  Nurses Note -- 4 Eyes      4/5/2023       Skin assessed on: Q Shift      [x] No Pressure Injuries Present    []Prevention Measures Documented    [] Yes LDA  for Pressure Injury Previously documented     [] Yes New Pressure Injury Discovered   [] LDA for New Pressure Injury Added      Attending RN:  Maxi Clay, RN     Second RN:  Sarika PRADHAN          Should increase furosemide to 40 mg a day and have basic metabolic panel in 1 week